# Patient Record
Sex: FEMALE | Race: WHITE | NOT HISPANIC OR LATINO | Employment: OTHER | URBAN - METROPOLITAN AREA
[De-identification: names, ages, dates, MRNs, and addresses within clinical notes are randomized per-mention and may not be internally consistent; named-entity substitution may affect disease eponyms.]

---

## 2020-01-21 ENCOUNTER — APPOINTMENT (OUTPATIENT)
Dept: LAB | Facility: HOSPITAL | Age: 45
End: 2020-01-21

## 2020-01-21 ENCOUNTER — PREP FOR SURGERY (OUTPATIENT)
Dept: OTHER | Facility: HOSPITAL | Age: 45
End: 2020-01-21

## 2020-01-21 ENCOUNTER — OFFICE VISIT (OUTPATIENT)
Dept: GASTROENTEROLOGY | Facility: CLINIC | Age: 45
End: 2020-01-21

## 2020-01-21 VITALS
SYSTOLIC BLOOD PRESSURE: 130 MMHG | WEIGHT: 265 LBS | HEIGHT: 67 IN | TEMPERATURE: 97.6 F | OXYGEN SATURATION: 98 % | HEART RATE: 75 BPM | DIASTOLIC BLOOD PRESSURE: 90 MMHG | BODY MASS INDEX: 41.59 KG/M2

## 2020-01-21 DIAGNOSIS — R10.12 LEFT UPPER QUADRANT PAIN: ICD-10-CM

## 2020-01-21 DIAGNOSIS — K21.9 GASTROESOPHAGEAL REFLUX DISEASE, ESOPHAGITIS PRESENCE NOT SPECIFIED: Primary | ICD-10-CM

## 2020-01-21 DIAGNOSIS — K58.9 IRRITABLE BOWEL SYNDROME, UNSPECIFIED TYPE: ICD-10-CM

## 2020-01-21 DIAGNOSIS — R11.0 NAUSEA: ICD-10-CM

## 2020-01-21 PROBLEM — G89.29 CHRONIC LOW BACK PAIN: Status: ACTIVE | Noted: 2018-08-08

## 2020-01-21 PROBLEM — M54.50 CHRONIC LOW BACK PAIN: Status: ACTIVE | Noted: 2018-08-08

## 2020-01-21 LAB
ALBUMIN SERPL-MCNC: 4.6 G/DL (ref 3.5–5.2)
ALBUMIN/GLOB SERPL: 1.3 G/DL
ALP SERPL-CCNC: 62 U/L (ref 39–117)
ALT SERPL W P-5'-P-CCNC: 50 U/L (ref 1–33)
AMYLASE SERPL-CCNC: 33 U/L (ref 28–100)
ANION GAP SERPL CALCULATED.3IONS-SCNC: 13.6 MMOL/L (ref 5–15)
AST SERPL-CCNC: 68 U/L (ref 1–32)
BASOPHILS # BLD AUTO: 0.05 10*3/MM3 (ref 0–0.2)
BASOPHILS NFR BLD AUTO: 0.8 % (ref 0–1.5)
BILIRUB SERPL-MCNC: 0.3 MG/DL (ref 0.1–1.2)
BUN BLD-MCNC: 17 MG/DL (ref 6–20)
BUN/CREAT SERPL: 15.9 (ref 7–25)
CALCIUM SPEC-SCNC: 9.2 MG/DL (ref 8.6–10.5)
CHLORIDE SERPL-SCNC: 98 MMOL/L (ref 98–107)
CO2 SERPL-SCNC: 28.4 MMOL/L (ref 22–29)
CREAT BLD-MCNC: 1.07 MG/DL (ref 0.57–1)
DEPRECATED RDW RBC AUTO: 51 FL (ref 37–54)
EOSINOPHIL # BLD AUTO: 0.32 10*3/MM3 (ref 0–0.4)
EOSINOPHIL NFR BLD AUTO: 4.9 % (ref 0.3–6.2)
ERYTHROCYTE [DISTWIDTH] IN BLOOD BY AUTOMATED COUNT: 14.8 % (ref 12.3–15.4)
GFR SERPL CREATININE-BSD FRML MDRD: 56 ML/MIN/1.73
GLOBULIN UR ELPH-MCNC: 3.5 GM/DL
GLUCOSE BLD-MCNC: 125 MG/DL (ref 65–99)
HCT VFR BLD AUTO: 39 % (ref 34–46.6)
HGB BLD-MCNC: 12.5 G/DL (ref 12–15.9)
IMM GRANULOCYTES # BLD AUTO: 0.11 10*3/MM3 (ref 0–0.05)
IMM GRANULOCYTES NFR BLD AUTO: 1.7 % (ref 0–0.5)
LIPASE SERPL-CCNC: 35 U/L (ref 13–60)
LYMPHOCYTES # BLD AUTO: 1.69 10*3/MM3 (ref 0.7–3.1)
LYMPHOCYTES NFR BLD AUTO: 25.7 % (ref 19.6–45.3)
MCH RBC QN AUTO: 30 PG (ref 26.6–33)
MCHC RBC AUTO-ENTMCNC: 32.1 G/DL (ref 31.5–35.7)
MCV RBC AUTO: 93.5 FL (ref 79–97)
MONOCYTES # BLD AUTO: 0.45 10*3/MM3 (ref 0.1–0.9)
MONOCYTES NFR BLD AUTO: 6.8 % (ref 5–12)
NEUTROPHILS # BLD AUTO: 3.96 10*3/MM3 (ref 1.7–7)
NEUTROPHILS NFR BLD AUTO: 60.1 % (ref 42.7–76)
NRBC BLD AUTO-RTO: 0 /100 WBC (ref 0–0.2)
PLATELET # BLD AUTO: 281 10*3/MM3 (ref 140–450)
PMV BLD AUTO: 10.4 FL (ref 6–12)
POTASSIUM BLD-SCNC: 4 MMOL/L (ref 3.5–5.2)
PROT SERPL-MCNC: 8.1 G/DL (ref 6–8.5)
RBC # BLD AUTO: 4.17 10*6/MM3 (ref 3.77–5.28)
SODIUM BLD-SCNC: 140 MMOL/L (ref 136–145)
WBC NRBC COR # BLD: 6.58 10*3/MM3 (ref 3.4–10.8)

## 2020-01-21 PROCEDURE — 82150 ASSAY OF AMYLASE: CPT | Performed by: NURSE PRACTITIONER

## 2020-01-21 PROCEDURE — 99214 OFFICE O/P EST MOD 30 MIN: CPT | Performed by: NURSE PRACTITIONER

## 2020-01-21 PROCEDURE — 36415 COLL VENOUS BLD VENIPUNCTURE: CPT | Performed by: NURSE PRACTITIONER

## 2020-01-21 PROCEDURE — 85025 COMPLETE CBC W/AUTO DIFF WBC: CPT | Performed by: NURSE PRACTITIONER

## 2020-01-21 PROCEDURE — 80053 COMPREHEN METABOLIC PANEL: CPT | Performed by: NURSE PRACTITIONER

## 2020-01-21 PROCEDURE — 83690 ASSAY OF LIPASE: CPT | Performed by: NURSE PRACTITIONER

## 2020-01-21 RX ORDER — PROMETHAZINE HYDROCHLORIDE 25 MG/1
25 TABLET ORAL EVERY 6 HOURS PRN
COMMUNITY
Start: 2019-11-21

## 2020-01-21 RX ORDER — SUCRALFATE 1 G/1
TABLET ORAL
COMMUNITY
End: 2020-01-27 | Stop reason: SDUPTHER

## 2020-01-21 RX ORDER — IPRATROPIUM/ALBUTEROL SULFATE 20-100 MCG
MIST INHALER (GRAM) INHALATION
COMMUNITY
Start: 2019-11-18

## 2020-01-21 RX ORDER — LOSARTAN POTASSIUM AND HYDROCHLOROTHIAZIDE 12.5; 5 MG/1; MG/1
TABLET ORAL DAILY
COMMUNITY
Start: 2019-11-27 | End: 2021-03-12

## 2020-01-21 RX ORDER — BUDESONIDE AND FORMOTEROL FUMARATE DIHYDRATE 160; 4.5 UG/1; UG/1
2 AEROSOL RESPIRATORY (INHALATION)
COMMUNITY

## 2020-01-21 RX ORDER — GABAPENTIN 800 MG/1
800 TABLET ORAL 2 TIMES DAILY
COMMUNITY

## 2020-01-21 RX ORDER — DICYCLOMINE HCL 20 MG
TABLET ORAL
COMMUNITY
Start: 2019-12-16 | End: 2020-01-21 | Stop reason: SDUPTHER

## 2020-01-21 RX ORDER — LEVOTHYROXINE SODIUM 0.2 MG/1
200 TABLET ORAL DAILY
COMMUNITY
Start: 2019-11-13

## 2020-01-21 RX ORDER — DICYCLOMINE HCL 20 MG
40 TABLET ORAL 3 TIMES DAILY PRN
Qty: 90 TABLET | Refills: 2 | Status: SHIPPED | OUTPATIENT
Start: 2020-01-21 | End: 2021-03-12

## 2020-01-21 RX ORDER — RANITIDINE 150 MG/1
TABLET ORAL
COMMUNITY
Start: 2019-12-15 | End: 2020-01-27 | Stop reason: SDUPTHER

## 2020-01-21 RX ORDER — ESOMEPRAZOLE MAGNESIUM 40 MG/1
CAPSULE, DELAYED RELEASE ORAL
COMMUNITY
Start: 2018-06-11 | End: 2020-03-02 | Stop reason: SDUPTHER

## 2020-01-21 RX ORDER — AMLODIPINE BESYLATE 10 MG/1
10 TABLET ORAL DAILY
COMMUNITY
End: 2022-10-10

## 2020-01-21 RX ORDER — HYDROCODONE BITARTRATE AND ACETAMINOPHEN 7.5; 325 MG/1; MG/1
TABLET ORAL EVERY 4 HOURS PRN
COMMUNITY

## 2020-01-21 RX ORDER — ALBUTEROL SULFATE 90 UG/1
AEROSOL, METERED RESPIRATORY (INHALATION)
Status: ON HOLD | COMMUNITY
Start: 2019-11-11 | End: 2022-03-23

## 2020-01-21 RX ORDER — DULOXETIN HYDROCHLORIDE 30 MG/1
CAPSULE, DELAYED RELEASE ORAL
COMMUNITY
Start: 2019-12-18 | End: 2022-01-06

## 2020-01-21 RX ORDER — TIZANIDINE 4 MG/1
4 TABLET ORAL EVERY 8 HOURS PRN
COMMUNITY
Start: 2018-01-20

## 2020-01-21 NOTE — PATIENT INSTRUCTIONS
Schedule EGD for further evaluation of symptoms.    Check lab work today.    For GERD, continue Nexium and randitine as prescribed.    For abdominal pain, may continue to use dicyclomine as needed.  Refill provided.    Follow-up after testing complete.  Call for any new or worsening symptoms.

## 2020-01-21 NOTE — PROGRESS NOTES
Chief Complaint   Patient presents with   • Abdominal Pain   • Abdominal Cramping   • Nausea       HPI  Patient is a 44-year-old female who presents today for follow-up.  She has a history of gastritis, diverticulitis status post resection, and has had prior episodes of pancreatitis for which she underwent ERCP x3.    Patient presents today with concerns about abdominal pain.  Pain is present to the left upper quadrant and radiates across the upper abdomen.  It has been occurring intermittently for 6 months.  It is described at times as a ripping sensation at other times as a burning sensation.  It is worse if she eats or drinks.  She also at times experience as a spasming to her right upper quadrant.  She has had nausea associated with this but denies any vomiting.    She has been taking Nexium and ranitidine.  She is having persistent symptoms despite use of these medications.  She saw her ENT recently for hoarseness who felt that this was related to reflux.    She also has a history of irritable bowel syndrome.  She has been taking dicyclomine for this.  She has been taking 2 of the 20 mg tablets which has been helping her symptoms.  With use of this medication she reports regular bowel movements.  Denies any blood in the stool or dark stool.    Review of Systems   Constitutional: Negative for appetite change, chills, diaphoresis, fatigue, fever and unexpected weight change.   HENT: Positive for voice change. Negative for dental problem, ear pain, mouth sores, rhinorrhea, sore throat and trouble swallowing.    Eyes: Negative for pain, redness and visual disturbance.   Respiratory: Positive for wheezing. Negative for cough, chest tightness and shortness of breath.    Cardiovascular: Negative for chest pain, palpitations and leg swelling.   Gastrointestinal: Positive for abdominal pain and nausea. Negative for abdominal distention, blood in stool, constipation, diarrhea and vomiting.   Endocrine: Negative for cold  intolerance, heat intolerance, polydipsia, polyphagia and polyuria.   Genitourinary: Positive for frequency. Negative for dysuria and urgency.   Musculoskeletal: Positive for arthralgias, back pain and myalgias. Negative for joint swelling and neck pain.   Skin: Negative for rash.   Allergic/Immunologic: Negative for environmental allergies, food allergies and immunocompromised state.   Neurological: Negative for dizziness, tremors, seizures, weakness, numbness and headaches.   Hematological: Negative for adenopathy. Does not bruise/bleed easily.   Psychiatric/Behavioral: Positive for dysphoric mood and sleep disturbance. Negative for suicidal ideas. The patient is not nervous/anxious.         Problem List:    Patient Active Problem List   Diagnosis   • Anxiety   • Chronic low back pain   • COPD (chronic obstructive pulmonary disease) (CMS/[facility])   • Fibromyalgia   • Gastroesophageal reflux disease   • HTN (hypertension)   • Thyroid disease   • Irritable bowel syndrome   • Nausea   • Left upper quadrant pain       Medical History:    Past Medical History:   Diagnosis Date   • Asthma    • COPD (chronic obstructive pulmonary disease) (CMS/[facility])    • Diverticulitis of colon    • Fibromyalgia    • Gastritis    • Irritable bowel syndrome         Social History:    Social History     Socioeconomic History   • Marital status: Legally      Spouse name: Not on file   • Number of children: Not on file   • Years of education: Not on file   • Highest education level: Not on file   Tobacco Use   • Smoking status: Former Smoker   • Smokeless tobacco: Never Used   Substance and Sexual Activity   • Alcohol use: Never     Frequency: Never   • Drug use: Never       Family History:   Family History   Problem Relation Age of Onset   • Heart attack Mother    • Diverticulosis Mother    • Colon cancer Father    • Heart attack Father        Surgical History:   Past Surgical History:   Procedure Laterality Date   • ABDOMINAL SURGERY      • AXILLARY ABCESS IRRIGATION AND DEBRIDEMENT     • CHOLECYSTECTOMY     • COLON RESECTION     • COLONOSCOPY     • HYSTERECTOMY     • SINUS SURGERY      3   • THYROIDECTOMY     • TONSILLECTOMY           Current Outpatient Medications:   •  amLODIPine (NORVASC) 10 MG tablet, amlodipine 10 mg tablet, Disp: , Rfl:   •  budesonide-formoterol (SYMBICORT) 160-4.5 MCG/ACT inhaler, Inhale 2 puffs., Disp: , Rfl:   •  COMBIVENT RESPIMAT  MCG/ACT inhaler, INL 1 PUFF QID, Disp: , Rfl:   •  dicyclomine (BENTYL) 20 MG tablet, Take 2 tablets by mouth 3 (Three) Times a Day As Needed (abdominal pain, diarrhea)., Disp: 90 tablet, Rfl: 2  •  DULoxetine (CYMBALTA) 30 MG capsule, TK 1 C PO QD, Disp: , Rfl:   •  esomeprazole (nexIUM) 40 MG capsule, esomeprazole magnesium 40 mg capsule,delayed release, Disp: , Rfl:   •  gabapentin (NEURONTIN) 800 MG tablet, gabapentin 800 mg tablet, Disp: , Rfl:   •  HYDROcodone-acetaminophen (NORCO) 7.5-325 MG per tablet, Take  by mouth., Disp: , Rfl:   •  levothyroxine (SYNTHROID, LEVOTHROID) 200 MCG tablet, Take  by mouth Daily., Disp: , Rfl:   •  losartan-hydrochlorothiazide (HYZAAR) 50-12.5 MG per tablet, Take  by mouth Daily., Disp: , Rfl:   •  promethazine (PHENERGAN) 25 MG tablet, TK 1 T PO Q 4 H PRN NV, Disp: , Rfl:   •  raNITIdine (ZANTAC) 150 MG tablet, TK 1 T PO AT BEDTIME, Disp: , Rfl:   •  sodium chloride 0.9 % nebulizer solution 0.88 mL with albuterol (5 MG/ML) 0.5% nebulizer solution 0.6 mg, , Disp: , Rfl:   •  sucralfate (CARAFATE) 1 g tablet, sucralfate 1 gram tablet, Disp: , Rfl:   •  tiZANidine (ZANAFLEX) 4 MG tablet, Every 8 (Eight) Hours., Disp: , Rfl:   •  VENTOLIN  (90 Base) MCG/ACT inhaler, INHALE 2 PUFFS TID AS NEEDED, Disp: , Rfl:     Dilaudid  [hydromorphone hcl]; Statins; Ondansetron hcl; Adhesive tape; Butorphanol; and Ondansetron    Vitals:    01/21/20 0827   BP: 130/90   Pulse: 75   Temp: 97.6 °F (36.4 °C)   SpO2: 98%       Physical Exam   Constitutional: She  is oriented to person, place, and time. She appears well-developed and well-nourished. No distress.   HENT:   Head: Normocephalic and atraumatic.   Eyes: No scleral icterus.   Cardiovascular: Normal rate and regular rhythm.   Pulmonary/Chest: Effort normal and breath sounds normal. No respiratory distress.   Abdominal: Soft. Normal appearance and bowel sounds are normal. There is tenderness in the epigastric area.   Neurological: She is alert and oriented to person, place, and time.   Skin: Skin is warm and dry.   Psychiatric: She has a normal mood and affect. Thought content normal.   Vitals reviewed.      Assessment/ Plan  Diagnoses and all orders for this visit:    Gastroesophageal reflux disease, esophagitis presence not specified  -     CBC & Differential    Left upper quadrant pain  -     CBC & Differential  -     Comprehensive Metabolic Panel  -     Amylase  -     Lipase    Nausea    Irritable bowel syndrome, unspecified type    Other orders  -     sodium chloride 0.9 % nebulizer solution 0.88 mL with albuterol (5 MG/ML) 0.5% nebulizer solution 0.6 mg  -     esomeprazole (nexIUM) 40 MG capsule; esomeprazole magnesium 40 mg capsule,delayed release  -     HYDROcodone-acetaminophen (NORCO) 7.5-325 MG per tablet; Take  by mouth.  -     VENTOLIN  (90 Base) MCG/ACT inhaler; INHALE 2 PUFFS TID AS NEEDED  -     amLODIPine (NORVASC) 10 MG tablet; amlodipine 10 mg tablet  -     budesonide-formoterol (SYMBICORT) 160-4.5 MCG/ACT inhaler; Inhale 2 puffs.  -     Discontinue: dicyclomine (BENTYL) 20 MG tablet; TK 1 T PO TID  -     DULoxetine (CYMBALTA) 30 MG capsule; TK 1 C PO QD  -     gabapentin (NEURONTIN) 800 MG tablet; gabapentin 800 mg tablet  -     COMBIVENT RESPIMAT  MCG/ACT inhaler; INL 1 PUFF QID  -     levothyroxine (SYNTHROID, LEVOTHROID) 200 MCG tablet; Take  by mouth Daily.  -     losartan-hydrochlorothiazide (HYZAAR) 50-12.5 MG per tablet; Take  by mouth Daily.  -     promethazine (PHENERGAN) 25  MG tablet; TK 1 T PO Q 4 H PRN NV  -     raNITIdine (ZANTAC) 150 MG tablet; TK 1 T PO AT BEDTIME  -     sucralfate (CARAFATE) 1 g tablet; sucralfate 1 gram tablet  -     tiZANidine (ZANAFLEX) 4 MG tablet; Every 8 (Eight) Hours.  -     dicyclomine (BENTYL) 20 MG tablet; Take 2 tablets by mouth 3 (Three) Times a Day As Needed (abdominal pain, diarrhea).    Schedule EGD for further evaluation of symptoms.    Check lab work today.    Obtain prior records for review.    For GERD, continue Nexium and randitine as prescribed.    For abdominal pain, may continue to use dicyclomine as needed.  Refill provided.    Follow-up after testing complete.  Call for any new or worsening symptoms.    Return for Review results after testing complete.      Discussion:  We will schedule updated EGD for further evaluation of symptoms, assess for any evidence of persistent gastritis or peptic ulcer disease.  Will check lab work today to evaluate liver and pancreas.  If labs are abnormal, may need to consider updated imaging.

## 2020-01-24 RX ORDER — RANITIDINE 150 MG/1
TABLET ORAL
Qty: 30 TABLET | OUTPATIENT
Start: 2020-01-24

## 2020-01-24 NOTE — TELEPHONE ENCOUNTER
Denied refill of Raniditine due to recall.    Ok to change this prescription to famotidine 20mg daily at bedtime for GERD

## 2020-01-27 RX ORDER — RANITIDINE 150 MG/1
150 TABLET ORAL NIGHTLY
Qty: 30 TABLET | Refills: 2 | Status: SHIPPED | OUTPATIENT
Start: 2020-01-27 | End: 2021-03-12

## 2020-01-27 RX ORDER — SUCRALFATE 1 G/1
TABLET ORAL
Qty: 90 TABLET | Refills: 1 | Status: SHIPPED | OUTPATIENT
Start: 2020-01-27 | End: 2020-04-01 | Stop reason: SDUPTHER

## 2020-01-30 ENCOUNTER — OUTSIDE FACILITY SERVICE (OUTPATIENT)
Dept: GASTROENTEROLOGY | Facility: CLINIC | Age: 45
End: 2020-01-30

## 2020-01-30 ENCOUNTER — LAB REQUISITION (OUTPATIENT)
Dept: LAB | Facility: HOSPITAL | Age: 45
End: 2020-01-30

## 2020-01-30 DIAGNOSIS — Z00.00 ENCOUNTER FOR GENERAL ADULT MEDICAL EXAMINATION WITHOUT ABNORMAL FINDINGS: ICD-10-CM

## 2020-01-30 PROCEDURE — 88305 TISSUE EXAM BY PATHOLOGIST: CPT | Performed by: INTERNAL MEDICINE

## 2020-01-30 PROCEDURE — 43239 EGD BIOPSY SINGLE/MULTIPLE: CPT | Performed by: INTERNAL MEDICINE

## 2020-01-31 LAB
CYTO UR: NORMAL
LAB AP CASE REPORT: NORMAL
LAB AP CLINICAL INFORMATION: NORMAL
PATH REPORT.FINAL DX SPEC: NORMAL
PATH REPORT.GROSS SPEC: NORMAL

## 2020-02-12 ENCOUNTER — OFFICE VISIT (OUTPATIENT)
Dept: GASTROENTEROLOGY | Facility: CLINIC | Age: 45
End: 2020-02-12

## 2020-02-12 VITALS
TEMPERATURE: 99 F | HEART RATE: 78 BPM | BODY MASS INDEX: 44.57 KG/M2 | DIASTOLIC BLOOD PRESSURE: 84 MMHG | SYSTOLIC BLOOD PRESSURE: 130 MMHG | HEIGHT: 67 IN | WEIGHT: 284 LBS | OXYGEN SATURATION: 99 %

## 2020-02-12 DIAGNOSIS — K31.84 GASTROPARESIS: Primary | ICD-10-CM

## 2020-02-12 DIAGNOSIS — K76.0 FATTY LIVER: ICD-10-CM

## 2020-02-12 PROCEDURE — 99214 OFFICE O/P EST MOD 30 MIN: CPT | Performed by: NURSE PRACTITIONER

## 2020-02-12 RX ORDER — ERYTHROMYCIN 250 MG/1
250 TABLET, COATED ORAL
Qty: 90 TABLET | Refills: 1 | Status: SHIPPED | OUTPATIENT
Start: 2020-02-12 | End: 2021-03-12

## 2020-02-12 NOTE — PROGRESS NOTES
Chief Complaint   Patient presents with   • Abdominal Pain     RUQ at night   • Irritable Bowel Syndrome       HPI  Patient is a 44-year-old female who presents today for follow-up.  She has a history of gastritis, diverticulitis status post resection, IBS, and pancreatitis for which she has undergone ERCP.    She was last seen in the office January 2020 for abdominal pain, GERD, IBS.  Lab work was obtained was normal amylase and lipase.  LFTs were mildly elevated with ALT 50 and AST 68, which appears stable. EGD was performed which showed a large amount of food in the gastric body, otherwise normal. Duodenal biopsies were unremarkable, gastric biopsies were negative for H. Pylori.    She had an MRCP in December 2018 that showed enlarged liver secondary to severe fatty infiltration, incidental 1.8 cm hepatic angioma.  There was an unremarkable appearance of the biliary tree postcholecystectomy.    Gastric emptying study from 2014 showed borderline  low gastric emptying for the first 2 hours, 4-hour gastric emptying time was normal.    Patient presents today for follow-up.  She reports she has had continued symptoms since her last office visit.    She reports intermittent abdominal pain.  Pain has more recently been present to the right upper quadrant and is described as a dull ache.  She continues to report nausea and reflux despite use of PPI.  She has been experiencing abdominal bloating and early satiety.    She reports over the last year she has had close to a 100 pound weight gain.    Patient reports she recently had consultation with Dr. Pham for Lap band.  She is meeting with a nutritionist tomorrow to start a weight loss program.    Review of Systems   Constitutional: Negative for appetite change, chills, diaphoresis, fatigue, fever and unexpected weight change.   HENT: Negative for dental problem, mouth sores, rhinorrhea, sore throat and voice change.    Eyes: Negative for pain, redness and visual  disturbance.   Respiratory: Negative for cough, chest tightness and wheezing.    Cardiovascular: Negative for chest pain, palpitations and leg swelling.   Endocrine: Negative for cold intolerance, heat intolerance, polydipsia, polyphagia and polyuria.   Genitourinary: Negative for dysuria, frequency, hematuria and urgency.   Musculoskeletal: Positive for arthralgias, back pain and myalgias. Negative for joint swelling and neck pain.   Skin: Negative for rash.   Allergic/Immunologic: Negative for environmental allergies, food allergies and immunocompromised state.   Neurological: Negative for dizziness, seizures, weakness, numbness and headaches.   Hematological: Does not bruise/bleed easily.   Psychiatric/Behavioral: Positive for sleep disturbance. The patient is not nervous/anxious.         Problem List:    Patient Active Problem List   Diagnosis   • Anxiety   • Chronic low back pain   • COPD (chronic obstructive pulmonary disease) (CMS/[facility])   • Fibromyalgia   • Gastroesophageal reflux disease   • HTN (hypertension)   • Thyroid disease   • Irritable bowel syndrome   • Nausea   • Left upper quadrant pain       Medical History:    Past Medical History:   Diagnosis Date   • Asthma    • COPD (chronic obstructive pulmonary disease) (CMS/[facility])    • Diverticulitis of colon    • Fibromyalgia    • Gastritis    • Irritable bowel syndrome         Social History:    Social History     Socioeconomic History   • Marital status: Legally      Spouse name: Not on file   • Number of children: Not on file   • Years of education: Not on file   • Highest education level: Not on file   Tobacco Use   • Smoking status: Former Smoker   • Smokeless tobacco: Never Used   Substance and Sexual Activity   • Alcohol use: Never     Frequency: Never   • Drug use: Never       Family History:   Family History   Problem Relation Age of Onset   • Heart attack Mother    • Diverticulosis Mother    • Colon cancer Father    • Heart attack Father         Surgical History:   Past Surgical History:   Procedure Laterality Date   • ABDOMINAL SURGERY     • AXILLARY ABCESS IRRIGATION AND DEBRIDEMENT     • CHOLECYSTECTOMY     • COLON RESECTION     • COLONOSCOPY  2017   • HYSTERECTOMY     • SINUS SURGERY      3   • THYROIDECTOMY     • TONSILLECTOMY     • UPPER GASTROINTESTINAL ENDOSCOPY  01/2020         Current Outpatient Medications:   •  amLODIPine (NORVASC) 10 MG tablet, amlodipine 10 mg tablet, Disp: , Rfl:   •  budesonide-formoterol (SYMBICORT) 160-4.5 MCG/ACT inhaler, Inhale 2 puffs., Disp: , Rfl:   •  COMBIVENT RESPIMAT  MCG/ACT inhaler, INL 1 PUFF QID, Disp: , Rfl:   •  dicyclomine (BENTYL) 20 MG tablet, Take 2 tablets by mouth 3 (Three) Times a Day As Needed (abdominal pain, diarrhea)., Disp: 90 tablet, Rfl: 2  •  DULoxetine (CYMBALTA) 30 MG capsule, TK 1 C PO QD, Disp: , Rfl:   •  erythromycin base (E-MYCIN) 250 MG tablet, Take 1 tablet by mouth 3 (Three) Times a Day Before Meals., Disp: 90 tablet, Rfl: 1  •  esomeprazole (nexIUM) 40 MG capsule, esomeprazole magnesium 40 mg capsule,delayed release, Disp: , Rfl:   •  gabapentin (NEURONTIN) 800 MG tablet, gabapentin 800 mg tablet, Disp: , Rfl:   •  HYDROcodone-acetaminophen (NORCO) 7.5-325 MG per tablet, Take  by mouth., Disp: , Rfl:   •  levothyroxine (SYNTHROID, LEVOTHROID) 200 MCG tablet, Take  by mouth Daily., Disp: , Rfl:   •  losartan-hydrochlorothiazide (HYZAAR) 50-12.5 MG per tablet, Take  by mouth Daily., Disp: , Rfl:   •  promethazine (PHENERGAN) 25 MG tablet, TK 1 T PO Q 4 H PRN NV, Disp: , Rfl:   •  raNITIdine (ZANTAC) 150 MG tablet, Take 1 tablet by mouth Every Night., Disp: 30 tablet, Rfl: 2  •  sodium chloride 0.9 % nebulizer solution 0.88 mL with albuterol (5 MG/ML) 0.5% nebulizer solution 0.6 mg, , Disp: , Rfl:   •  sucralfate (CARAFATE) 1 g tablet, Dissolve 1 tablet in 1 tablespoon of water and drink three times daily, Disp: 90 tablet, Rfl: 1  •  tiZANidine (ZANAFLEX) 4 MG tablet, Every  8 (Eight) Hours., Disp: , Rfl:   •  VENTOLIN  (90 Base) MCG/ACT inhaler, INHALE 2 PUFFS TID AS NEEDED, Disp: , Rfl:     Allergies:  Dilaudid  [hydromorphone hcl]; Statins; Ondansetron hcl; Adhesive tape; Butorphanol; and Ondansetron    Vitals:    02/12/20 0836   BP: 130/84   Pulse: 78   Temp: 99 °F (37.2 °C)   SpO2: 99%       Physical Exam   Constitutional: She is oriented to person, place, and time. She appears well-developed and well-nourished. No distress.   HENT:   Head: Normocephalic and atraumatic.   Eyes: No scleral icterus.   Cardiovascular: Normal rate and regular rhythm.   Pulmonary/Chest: Effort normal and breath sounds normal. No respiratory distress.   Abdominal: Soft. Bowel sounds are normal. She exhibits no distension. There is no tenderness.   Neurological: She is alert and oriented to person, place, and time.   Skin: Skin is warm and dry.   Psychiatric: She has a normal mood and affect. Thought content normal.   Vitals reviewed.      Assessment/ Plan  Diagnoses and all orders for this visit:    Gastroparesis    Fatty liver    Other orders  -     erythromycin base (E-MYCIN) 250 MG tablet; Take 1 tablet by mouth 3 (Three) Times a Day Before Meals.    For gastroparesis, begin trial of erythromycin as prescribed.  Follow gastroparesis diet.    For fatty liver, recommend following a low-fat and low sugar diet.  Weight loss is recommended as well as regular exercise.  Recommend management of high cholesterol and blood glucose with primary care provider if indicated. Alcohol avoidance is recommended.    Return in about 3 months (around 5/12/2020).      Discussion:  Discussed with patient today suspect gastroparesis is etiology of upper GI symptoms.  Dietary and medication management of this condition were reviewed today.  Discussed that full right upper quadrant pain may be secondary to fatty liver and stretch of liver capsule.    EGD and lab work findings reviewed at today's office visit.   Discussed diagnosis of gastroparesis and treatment options.  Also discussed fatty liver and low-fat/low sugar diet and weight loss were recommended.    If erythromycin does not lead to any improvement or patient develops tachyphylaxis, to consider trial of domperidone for gastroparesis.  Risk of cardiac arrhythmias and need for EKG monitoring associated with domperidone were reviewed at today's office visit.

## 2020-02-12 NOTE — PATIENT INSTRUCTIONS
For gastroparesis, recommend eating smaller more frequent meals as opposed to large meals.  Recommend following a low-fat and low fiber diet, which is easier to digest.    For gastroparesis, begin trial of erythromycin as prescribed.  Prescription sent to pharmacy.    For fatty liver, recommend following a low-fat and low sugar diet.  Weight loss is recommended as well as regular exercise.  Recommend management of high cholesterol and blood glucose with primary care provider if indicated. Alcohol avoidance is recommended.      Gastroparesis    Gastroparesis is a condition in which food takes longer than normal to empty from the stomach. The condition is usually long-lasting (chronic). It may also be called delayed gastric emptying.  There is no cure, but there are treatments and things that you can do at home to help relieve symptoms. Treating the underlying condition that causes gastroparesis can also help relieve symptoms.  What are the causes?  In many cases, the cause of this condition is not known. Possible causes include:  · A hormone (endocrine) disorder, such as hypothyroidism or diabetes.  · A nervous system disease, such as Parkinson's disease or multiple sclerosis.  · Cancer, infection, or surgery that affects the stomach or vagus nerve. The vagus nerve runs from your chest, through your neck, to the lower part of your brain.  · A connective tissue disorder, such as scleroderma.  · Certain medicines.  What increases the risk?  You are more likely to develop this condition if you:  · Have certain disorders or diseases, including:  ? An endocrine disorder.  ? An eating disorder.  ? Amyloidosis.  ? Scleroderma.  ? Parkinson's disease.  ? Multiple sclerosis.  ? Cancer or infection of the stomach or the vagus nerve.  · Have had surgery on the stomach or vagus nerve.  · Take certain medicines.  · Are female.  What are the signs or symptoms?  Symptoms of this condition include:  · Feeling full after eating very  little.  · Nausea.  · Vomiting.  · Heartburn.  · Abdominal bloating.  · Inconsistent blood sugar (glucose) levels on blood tests.  · Lack of appetite.  · Weight loss.  · Acid from the stomach coming up into the esophagus (gastroesophageal reflux).  · Sudden tightening (spasm) of the stomach, which can be painful.  Symptoms may come and go. Some people may not notice any symptoms.  How is this diagnosed?  This condition is diagnosed with tests, such as:  · Tests that check how long it takes food to move through the stomach and intestines. These tests include:  ? Upper gastrointestinal (GI) series. For this test, you drink a liquid that shows up well on X-rays, and then X-rays will be taken of your intestines.  ? Gastric emptying scintigraphy. For this test, you eat food that contains a small amount of radioactive material, and then scans are taken.  ? Wireless capsule GI monitoring system. For this test, you swallow a pill (capsule) that records information about how foods and fluid move through your stomach.  · Gastric manometry. For this test, a tube is passed down your throat and into your stomach to measure electrical and muscular activity.  · Endoscopy. For this test, a long, thin tube is passed down your throat and into your stomach to check for problems in your stomach lining.  · Ultrasound. This test uses sound waves to create images of inside the body. This can help rule out gallbladder disease or pancreatitis as a cause of your symptoms.  How is this treated?  There is no cure for gastroparesis. Treatment may include:  · Treating the underlying cause.  · Managing your symptoms by making changes to your diet and exercise habits.  · Taking medicines to control nausea and vomiting and to stimulate stomach muscles.  · Getting food through a feeding tube in the hospital. This may be done in severe cases.  · Having surgery to insert a device into your body that helps improve stomach emptying and control nausea  and vomiting (gastric neurostimulator).  Follow these instructions at home:  · Take over-the-counter and prescription medicines only as told by your health care provider.  · Follow instructions from your health care provider about eating or drinking restrictions. Your health care provider may recommend that you:  ? Eat smaller meals more often.  ? Eat low-fat foods.  ? Eat low-fiber forms of high-fiber foods. For example, eat cooked vegetables instead of raw vegetables.  ? Have only liquid foods instead of solid foods. Liquid foods are easier to digest.  · Drink enough fluid to keep your urine pale yellow.  · Exercise as often as told by your health care provider.  · Keep all follow-up visits as told by your health care provider. This is important.  Contact a health care provider if you:  · Notice that your symptoms do not improve with treatment.  · Have new symptoms.  Get help right away if you:  · Have severe abdominal pain that does not improve with treatment.  · Have nausea that is severe or does not go away.  · Cannot drink fluids without vomiting.  Summary  · Gastroparesis is a chronic condition in which food takes longer than normal to empty from the stomach.  · Symptoms include nausea, vomiting, heartburn, abdominal bloating, and loss of appetite.  · Eating smaller portions, and low-fat, low-fiber foods may help you manage your symptoms.  · Get help right away if you have severe abdominal pain.  This information is not intended to replace advice given to you by your health care provider. Make sure you discuss any questions you have with your health care provider.  Document Released: 12/18/2006 Document Revised: 10/23/2018 Document Reviewed: 10/23/2018  [employer] © 2019 [employer]

## 2020-03-02 RX ORDER — ESOMEPRAZOLE MAGNESIUM 40 MG/1
40 CAPSULE, DELAYED RELEASE ORAL DAILY
Qty: 30 CAPSULE | Refills: 5 | Status: SHIPPED | OUTPATIENT
Start: 2020-03-02 | End: 2020-08-25

## 2020-04-01 DIAGNOSIS — K21.9 GASTROESOPHAGEAL REFLUX DISEASE, ESOPHAGITIS PRESENCE NOT SPECIFIED: Primary | ICD-10-CM

## 2020-04-01 RX ORDER — SUCRALFATE 1 G/1
TABLET ORAL
Qty: 90 TABLET | Refills: 1 | Status: SHIPPED | OUTPATIENT
Start: 2020-04-01 | End: 2021-03-12

## 2020-08-25 RX ORDER — ESOMEPRAZOLE MAGNESIUM 40 MG/1
40 CAPSULE, DELAYED RELEASE ORAL DAILY
Qty: 30 CAPSULE | Refills: 5 | Status: SHIPPED | OUTPATIENT
Start: 2020-08-25

## 2021-02-01 NOTE — PROGRESS NOTES
"Chief Complaint   Patient presents with   • Abdominal Pain   Abdominal pain          History of Present Illness  45-year old female presents today for for follow up. She has a history of gastroparesis. She feels food is staying in her stomach. She has a history of COVID requiring a 62-day hospital stay on the vent and tracheostomy. She remains on O2. She had DVTs post COVID and takes Eliquis.      She reports abdominal pain in her RUQ that she describes as throbbing. She does not relate this to her BMs. This feels a little stronger than her IBS discomfort. She has a history of cholecystectomy. She takes     She has a history of fatty liver. Transaminases are slightly elevated based upon lab work January 2021. She reports weight gain.    She has a history of GERD and takes esomeprazole 40 mg daily and sucralfate.              Result Review :      ENDOSCOPY - SCAN -  EGD 01/30/2020 (01/30/2020)  COLONOSCOPY - SCAN - [facility] (06/13/2017)  MICROFILM RECORDS - SCAN - GASTRO GES [facility] (03/13/2014)  Tissue Pathology Exam (01/30/2020 08:50)  Comprehensive Metabolic Panel (01/21/2020 09:49)      Vital Signs:   /76   Pulse 76   Temp 97.1 °F (36.2 °C) (Temporal)   Resp 16   Ht 168.9 cm (66.5\")   Wt (!) 137 kg (302 lb)   SpO2 98% Comment: With oxygen  BMI 48.01 kg/m²     Body mass index is 48.01 kg/m².     Physical Exam  Vitals signs reviewed.   Constitutional:       Appearance: Normal appearance.   Abdominal:      General: Bowel sounds are normal. There is no distension.      Palpations: Abdomen is soft. Abdomen is not rigid. There is no mass or pulsatile mass.      Tenderness: There is abdominal tenderness in the right upper quadrant. There is no guarding or rebound.             Assessment and Plan      Diagnoses and all orders for this visit:    1. Gastroparesis (Primary)  Comments:  This is a chronic stable problem.    2. Right upper quadrant pain  Comments:  Patient has new and worsening right " upper quadrant pain that is undiagnosed.  She is status post cholecystectomy.  She does have a history of fatty liver.    3. Fatty liver  Comments:  Is a chronic stable problem.    4. Nausea    5. Diverticulosis of colon  Comments:  This is chronic and patient has been advised to maintain a high-fiber diet.    6. Elevated liver enzymes  Comments:  This is a chronic problem related to fatty liver disease.    Other orders  -     US Liver; Future         Brief Summary  We will proceed with an ultrasound the right upper quadrant to evaluate her new right upper quadrant pain.  She will continue Nexium for GERD and will continue small frequent meals for gastroparesis.  She also continue Bentyl for IBS.  Follow-up after the ultrasound.       Documentation by Deb HUFFMAN acting as a scribe in the following sections (HPI, Assessment, Plan) for the undersigned provider.       Follow Up   No follow-ups on file.     Patient Instructions   1. For further work up of right upper quadrant abdominal pain, we will order a liver u/s given your history of fatty liver and elevated liver enzymes.     2. For GERD, continue daily acid suppressive medication.     3. Additional recommendations pending outcome of u/s.

## 2021-02-03 ENCOUNTER — OFFICE VISIT (OUTPATIENT)
Dept: GASTROENTEROLOGY | Facility: CLINIC | Age: 46
End: 2021-02-03

## 2021-02-03 VITALS
BODY MASS INDEX: 45.99 KG/M2 | RESPIRATION RATE: 16 BRPM | HEIGHT: 67 IN | WEIGHT: 293 LBS | SYSTOLIC BLOOD PRESSURE: 130 MMHG | DIASTOLIC BLOOD PRESSURE: 76 MMHG | OXYGEN SATURATION: 98 % | HEART RATE: 76 BPM | TEMPERATURE: 97.1 F

## 2021-02-03 DIAGNOSIS — K76.0 FATTY LIVER: Chronic | ICD-10-CM

## 2021-02-03 DIAGNOSIS — R10.11 RIGHT UPPER QUADRANT PAIN: ICD-10-CM

## 2021-02-03 DIAGNOSIS — K31.84 GASTROPARESIS: Primary | Chronic | ICD-10-CM

## 2021-02-03 DIAGNOSIS — R11.0 NAUSEA: ICD-10-CM

## 2021-02-03 DIAGNOSIS — R74.8 ELEVATED LIVER ENZYMES: Chronic | ICD-10-CM

## 2021-02-03 DIAGNOSIS — K57.30 DIVERTICULOSIS OF COLON: Chronic | ICD-10-CM

## 2021-02-03 PROCEDURE — 99214 OFFICE O/P EST MOD 30 MIN: CPT | Performed by: INTERNAL MEDICINE

## 2021-02-03 RX ORDER — LEVOTHYROXINE SODIUM 0.1 MG/1
200 TABLET ORAL DAILY
COMMUNITY
Start: 2020-12-17 | End: 2021-03-12

## 2021-02-03 RX ORDER — OMEPRAZOLE 40 MG/1
40 CAPSULE, DELAYED RELEASE ORAL 2 TIMES DAILY
COMMUNITY
End: 2021-03-12

## 2021-02-03 RX ORDER — BUMETANIDE 2 MG/1
2 TABLET ORAL 2 TIMES DAILY
COMMUNITY
Start: 2020-12-17

## 2021-02-03 RX ORDER — LORATADINE 10 MG/1
10 TABLET ORAL DAILY
COMMUNITY
Start: 2020-11-19 | End: 2022-10-10

## 2021-02-03 RX ORDER — POTASSIUM CHLORIDE 750 MG/1
10 TABLET, FILM COATED, EXTENDED RELEASE ORAL 2 TIMES DAILY
COMMUNITY
Start: 2020-11-09

## 2021-02-03 RX ORDER — DICYCLOMINE HYDROCHLORIDE 10 MG/1
10 CAPSULE ORAL 3 TIMES DAILY
COMMUNITY
Start: 2020-12-31

## 2021-02-03 NOTE — PATIENT INSTRUCTIONS
1. For further work up of right upper quadrant abdominal pain, we will order a liver u/s given your history of fatty liver and elevated liver enzymes.     2. For GERD, continue daily acid suppressive medication.     3. Additional recommendations pending outcome of u/s.

## 2021-02-19 ENCOUNTER — HOSPITAL ENCOUNTER (OUTPATIENT)
Dept: ULTRASOUND IMAGING | Facility: HOSPITAL | Age: 46
Discharge: HOME | End: 2021-02-19
Admitting: INTERNAL MEDICINE

## 2021-02-19 DIAGNOSIS — R10.11 RIGHT UPPER QUADRANT PAIN: ICD-10-CM

## 2021-02-19 DIAGNOSIS — R74.8 ELEVATED LIVER ENZYMES: Chronic | ICD-10-CM

## 2021-02-19 DIAGNOSIS — K76.0 FATTY LIVER: Chronic | ICD-10-CM

## 2021-02-19 PROCEDURE — 76705 ECHO EXAM OF ABDOMEN: CPT

## 2021-03-11 NOTE — PROGRESS NOTES
"Chief Complaint   Patient presents with   • Abdominal Pain   • GI Problem           History of Present Illness  Patient is a 46-year-old female who presents today for follow-up. She has a history of gastroparesis and diverticulitis status post resection. She had a prolonged hospital stay due to COVID-19 infection. Also history of fatty liver and IBS.    She had a liver ultrasound performed February 2021 that showed fatty liver but was otherwise normal.    Patient presents today for follow-up.  She presents today with concerns about worsening gastroparesis symptoms.  She has been experiencing nausea, vomiting, abdominal bloating and distention, and pain to her upper abdomen.  She feels symptoms have worsened over the last few months.  She reports she believes she is having difficulty digesting her medications as her thyroid has been out of control.    She continues on Nexium for GERD.  Reports symptoms are reasonably well controlled with use of this medication.     She continues on dicyclomine for IBS symptoms.  She is generally having a daily bowel movement.  Denies any issues with diarrhea currently.  Denies any rectal bleeding.        Review of Systems   Respiratory: Positive for shortness of breath.    Gastrointestinal: Positive for abdominal distention, abdominal pain, nausea and vomiting.        Result Review :       Tissue Pathology Exam (01/30/2020 08:50)   SCANNED - COLONOSCOPY (06/13/2017)   ENDOSCOPY, INT (01/30/2020)   US Liver (02/19/2021 09:10)   Office Visit with Batsheva Simmons APRN (02/12/2020)   Office Visit with Nitesh Benoit MD (02/03/2021)       Vital Signs:   /82   Pulse 82   Temp 98 °F (36.7 °C)   Ht 168.9 cm (66.5\")   Wt (!) 140 kg (308 lb)   SpO2 96%   BMI 48.97 kg/m²     Body mass index is 48.97 kg/m².     Physical Exam  Vitals reviewed.   Constitutional:       General: She is not in acute distress.     Appearance: She is well-developed.   HENT:      Head: Normocephalic " and atraumatic.   Cardiovascular:      Rate and Rhythm: Normal rate and regular rhythm.   Pulmonary:      Effort: Pulmonary effort is normal. No respiratory distress.      Breath sounds: Normal breath sounds.   Abdominal:      General: Abdomen is protuberant.      Palpations: Abdomen is soft.      Tenderness: There is abdominal tenderness in the epigastric area.   Skin:     General: Skin is dry.      Coloration: Skin is not pale.   Neurological:      Mental Status: She is alert and oriented to person, place, and time.   Psychiatric:         Thought Content: Thought content normal.           Assessment and Plan    Diagnoses and all orders for this visit:    1. Gastroparesis (Primary)    2. Gastroesophageal reflux disease without esophagitis    3. Irritable bowel syndrome, unspecified type    4. Fatty liver    Other orders  -     erythromycin base (E-MYCIN) 250 MG tablet; Take 1 tablet by mouth 3 (Three) Times a Day Before Meals.  Dispense: 90 tablet; Refill: 0         Discussion  Patient presents today with concerns about worsening gastroparesis symptoms.  Will provide course of erythromycin for prognosis and dietary modifications to help with this was reinforced today.  We will also refer to [facility] motility clinic for further evaluation and to discuss possible gastric stimulator placement.    GERD and IBS remained stable and will continue current treatment.    Recent ultrasound showed fatty liver.  Dietary and lifestyle modifications to help with this were reviewed today.    If symptoms do not improve, we may consider trial of Xifaxan for possible small intestinal bacterial overgrowth.          Follow Up   Return if symptoms worsen or fail to improve.    Patient Instructions   For gastroparesis, begin taking erythromycin as prescribed.  Prescription sent to pharmacy.    For gastroparesis, we will refer to [facility] motility center.    For gastroparesis, follow a gastroparesis diet. Eat smaller more frequent meals as  opposed to large meals and foods lower in fat and fiber.    For GERD, continue esomeprazole daily as prescribed.    For IBS, continue dicyclomine as needed.    For fatty liver, weight loss is recommended. Recommend following a low fat and low sugar diet. Recommend management of diabetes and elevated cholesterol with primary care provider if indicated. Regular exercise is recommended. Alcohol avoidance is recommended.

## 2021-03-12 ENCOUNTER — OFFICE VISIT (OUTPATIENT)
Dept: GASTROENTEROLOGY | Facility: CLINIC | Age: 46
End: 2021-03-12

## 2021-03-12 VITALS
HEART RATE: 82 BPM | TEMPERATURE: 98 F | BODY MASS INDEX: 47.09 KG/M2 | DIASTOLIC BLOOD PRESSURE: 82 MMHG | SYSTOLIC BLOOD PRESSURE: 132 MMHG | HEIGHT: 66 IN | WEIGHT: 293 LBS | OXYGEN SATURATION: 96 %

## 2021-03-12 DIAGNOSIS — K58.9 IRRITABLE BOWEL SYNDROME, UNSPECIFIED TYPE: ICD-10-CM

## 2021-03-12 DIAGNOSIS — K31.84 GASTROPARESIS: Primary | ICD-10-CM

## 2021-03-12 DIAGNOSIS — K76.0 FATTY LIVER: ICD-10-CM

## 2021-03-12 DIAGNOSIS — K21.9 GASTROESOPHAGEAL REFLUX DISEASE WITHOUT ESOPHAGITIS: ICD-10-CM

## 2021-03-12 PROCEDURE — 99214 OFFICE O/P EST MOD 30 MIN: CPT | Performed by: NURSE PRACTITIONER

## 2021-03-12 RX ORDER — CYCLOSPORINE 0.5 MG/ML
EMULSION OPHTHALMIC EVERY 12 HOURS
COMMUNITY
Start: 2021-03-04 | End: 2022-10-10

## 2021-03-12 RX ORDER — ERYTHROMYCIN 250 MG/1
250 TABLET, COATED ORAL
Qty: 90 TABLET | Refills: 0 | Status: SHIPPED | OUTPATIENT
Start: 2021-03-12 | End: 2022-01-06

## 2021-03-12 NOTE — PATIENT INSTRUCTIONS
For gastroparesis, begin taking erythromycin as prescribed.  Prescription sent to pharmacy.    For gastroparesis, we will refer to [facility] motility center.    For gastroparesis, follow a gastroparesis diet. Eat smaller more frequent meals as opposed to large meals and foods lower in fat and fiber.    For GERD, continue esomeprazole daily as prescribed.    For IBS, continue dicyclomine as needed.    For fatty liver, weight loss is recommended. Recommend following a low fat and low sugar diet. Recommend management of diabetes and elevated cholesterol with primary care provider if indicated. Regular exercise is recommended. Alcohol avoidance is recommended.

## 2022-01-06 ENCOUNTER — PREP FOR SURGERY (OUTPATIENT)
Dept: SURGERY | Facility: SURGERY CENTER | Age: 47
End: 2022-01-06

## 2022-01-06 ENCOUNTER — OFFICE VISIT (OUTPATIENT)
Dept: GASTROENTEROLOGY | Facility: CLINIC | Age: 47
End: 2022-01-06

## 2022-01-06 VITALS
HEIGHT: 67 IN | SYSTOLIC BLOOD PRESSURE: 120 MMHG | BODY MASS INDEX: 45.99 KG/M2 | TEMPERATURE: 96.8 F | HEART RATE: 80 BPM | DIASTOLIC BLOOD PRESSURE: 72 MMHG | OXYGEN SATURATION: 95 % | WEIGHT: 293 LBS

## 2022-01-06 DIAGNOSIS — R10.11 RIGHT UPPER QUADRANT ABDOMINAL PAIN: Primary | ICD-10-CM

## 2022-01-06 DIAGNOSIS — K31.84 GASTROPARESIS: ICD-10-CM

## 2022-01-06 DIAGNOSIS — R11.0 NAUSEA: ICD-10-CM

## 2022-01-06 DIAGNOSIS — Z87.19 HISTORY OF DIVERTICULITIS: ICD-10-CM

## 2022-01-06 DIAGNOSIS — R10.13 EPIGASTRIC PAIN: ICD-10-CM

## 2022-01-06 PROCEDURE — 99214 OFFICE O/P EST MOD 30 MIN: CPT | Performed by: NURSE PRACTITIONER

## 2022-01-06 RX ORDER — SODIUM CHLORIDE 0.9 % (FLUSH) 0.9 %
10 SYRINGE (ML) INJECTION AS NEEDED
Status: CANCELLED | OUTPATIENT
Start: 2022-01-06

## 2022-01-06 RX ORDER — SODIUM CHLORIDE, SODIUM LACTATE, POTASSIUM CHLORIDE, CALCIUM CHLORIDE 600; 310; 30; 20 MG/100ML; MG/100ML; MG/100ML; MG/100ML
30 INJECTION, SOLUTION INTRAVENOUS CONTINUOUS PRN
Status: CANCELLED | OUTPATIENT
Start: 2022-01-06

## 2022-01-06 RX ORDER — SODIUM CHLORIDE 0.9 % (FLUSH) 0.9 %
3 SYRINGE (ML) INJECTION EVERY 12 HOURS SCHEDULED
Status: CANCELLED | OUTPATIENT
Start: 2022-01-06

## 2022-01-06 NOTE — PATIENT INSTRUCTIONS
Schedule EGD for further evaluation of symptoms.    Schedule CT abdomen pelvis with contrast for further evaluation of symptoms.

## 2022-02-04 ENCOUNTER — APPOINTMENT (OUTPATIENT)
Dept: CT IMAGING | Facility: HOSPITAL | Age: 47
End: 2022-02-04

## 2022-03-11 PROBLEM — R10.11 RIGHT UPPER QUADRANT ABDOMINAL PAIN: Status: ACTIVE | Noted: 2022-03-11

## 2022-03-11 PROBLEM — R10.13 EPIGASTRIC PAIN: Status: ACTIVE | Noted: 2022-03-11

## 2022-03-17 ENCOUNTER — TRANSCRIBE ORDERS (OUTPATIENT)
Dept: ADMINISTRATIVE | Facility: HOSPITAL | Age: 47
End: 2022-03-17

## 2022-03-17 DIAGNOSIS — Z01.812 PRE-OPERATIVE LABORATORY EXAMINATION: Primary | ICD-10-CM

## 2022-03-21 ENCOUNTER — LAB (OUTPATIENT)
Dept: LAB | Facility: HOSPITAL | Age: 47
End: 2022-03-21

## 2022-03-21 DIAGNOSIS — Z01.812 PRE-OPERATIVE LABORATORY EXAMINATION: ICD-10-CM

## 2022-03-21 LAB — SARS-COV-2 RNA PNL SPEC NAA+PROBE: NOT DETECTED

## 2022-03-21 PROCEDURE — C9803 HOPD COVID-19 SPEC COLLECT: HCPCS

## 2022-03-21 PROCEDURE — 87635 SARS-COV-2 COVID-19 AMP PRB: CPT | Performed by: INTERNAL MEDICINE

## 2022-03-22 ENCOUNTER — ANESTHESIA EVENT (OUTPATIENT)
Dept: PERIOP | Facility: HOSPITAL | Age: 47
End: 2022-03-22

## 2022-03-22 RX ORDER — ATORVASTATIN CALCIUM 10 MG/1
10 TABLET, FILM COATED ORAL DAILY
COMMUNITY

## 2022-03-22 RX ORDER — AMLODIPINE BESYLATE 5 MG/1
5 TABLET ORAL DAILY
COMMUNITY
End: 2022-10-10

## 2022-03-22 RX ORDER — GLIPIZIDE 5 MG/1
5 TABLET ORAL DAILY
COMMUNITY

## 2022-03-23 ENCOUNTER — HOSPITAL ENCOUNTER (OUTPATIENT)
Facility: HOSPITAL | Age: 47
Setting detail: HOSPITAL OUTPATIENT SURGERY
Discharge: HOME | End: 2022-03-23
Attending: INTERNAL MEDICINE | Admitting: INTERNAL MEDICINE

## 2022-03-23 ENCOUNTER — ANESTHESIA (OUTPATIENT)
Dept: PERIOP | Facility: HOSPITAL | Age: 47
End: 2022-03-23

## 2022-03-23 VITALS
RESPIRATION RATE: 14 BRPM | BODY MASS INDEX: 47.09 KG/M2 | HEART RATE: 68 BPM | SYSTOLIC BLOOD PRESSURE: 114 MMHG | OXYGEN SATURATION: 99 % | HEIGHT: 66 IN | TEMPERATURE: 97.5 F | DIASTOLIC BLOOD PRESSURE: 79 MMHG | WEIGHT: 293 LBS

## 2022-03-23 DIAGNOSIS — R10.11 RIGHT UPPER QUADRANT ABDOMINAL PAIN: ICD-10-CM

## 2022-03-23 DIAGNOSIS — R10.13 EPIGASTRIC PAIN: ICD-10-CM

## 2022-03-23 DIAGNOSIS — R11.0 NAUSEA: ICD-10-CM

## 2022-03-23 LAB
GLUCOSE BLDC GLUCOMTR-MCNC: 152 MG/DL (ref 70–130)
QT INTERVAL: 434 MS

## 2022-03-23 PROCEDURE — 43239 EGD BIOPSY SINGLE/MULTIPLE: CPT | Performed by: INTERNAL MEDICINE

## 2022-03-23 PROCEDURE — 82962 GLUCOSE BLOOD TEST: CPT

## 2022-03-23 PROCEDURE — 88342 IMHCHEM/IMCYTCHM 1ST ANTB: CPT | Performed by: INTERNAL MEDICINE

## 2022-03-23 PROCEDURE — 93010 ELECTROCARDIOGRAM REPORT: CPT | Performed by: INTERNAL MEDICINE

## 2022-03-23 PROCEDURE — 25010000002 PROPOFOL 10 MG/ML EMULSION: Performed by: NURSE ANESTHETIST, CERTIFIED REGISTERED

## 2022-03-23 PROCEDURE — 93005 ELECTROCARDIOGRAM TRACING: CPT | Performed by: NURSE ANESTHETIST, CERTIFIED REGISTERED

## 2022-03-23 PROCEDURE — 88305 TISSUE EXAM BY PATHOLOGIST: CPT | Performed by: INTERNAL MEDICINE

## 2022-03-23 RX ORDER — SODIUM CHLORIDE 0.9 % (FLUSH) 0.9 %
10 SYRINGE (ML) INJECTION AS NEEDED
Status: DISCONTINUED | OUTPATIENT
Start: 2022-03-23 | End: 2022-03-23 | Stop reason: HOSPADM

## 2022-03-23 RX ORDER — LIDOCAINE HYDROCHLORIDE 20 MG/ML
INJECTION, SOLUTION INFILTRATION; PERINEURAL AS NEEDED
Status: DISCONTINUED | OUTPATIENT
Start: 2022-03-23 | End: 2022-03-23 | Stop reason: SURG

## 2022-03-23 RX ORDER — PROPOFOL 10 MG/ML
VIAL (ML) INTRAVENOUS AS NEEDED
Status: DISCONTINUED | OUTPATIENT
Start: 2022-03-23 | End: 2022-03-23 | Stop reason: SURG

## 2022-03-23 RX ORDER — SODIUM CHLORIDE 0.9 % (FLUSH) 0.9 %
3 SYRINGE (ML) INJECTION EVERY 12 HOURS SCHEDULED
Status: DISCONTINUED | OUTPATIENT
Start: 2022-03-23 | End: 2022-03-23 | Stop reason: HOSPADM

## 2022-03-23 RX ORDER — GLYCOPYRROLATE 0.2 MG/ML
INJECTION INTRAMUSCULAR; INTRAVENOUS AS NEEDED
Status: DISCONTINUED | OUTPATIENT
Start: 2022-03-23 | End: 2022-03-23 | Stop reason: SURG

## 2022-03-23 RX ORDER — SODIUM CHLORIDE, SODIUM LACTATE, POTASSIUM CHLORIDE, CALCIUM CHLORIDE 600; 310; 30; 20 MG/100ML; MG/100ML; MG/100ML; MG/100ML
9 INJECTION, SOLUTION INTRAVENOUS CONTINUOUS
Status: DISCONTINUED | OUTPATIENT
Start: 2022-03-23 | End: 2022-03-23 | Stop reason: HOSPADM

## 2022-03-23 RX ORDER — SODIUM CHLORIDE, SODIUM LACTATE, POTASSIUM CHLORIDE, CALCIUM CHLORIDE 600; 310; 30; 20 MG/100ML; MG/100ML; MG/100ML; MG/100ML
30 INJECTION, SOLUTION INTRAVENOUS CONTINUOUS PRN
Status: DISCONTINUED | OUTPATIENT
Start: 2022-03-23 | End: 2022-03-23 | Stop reason: HOSPADM

## 2022-03-23 RX ORDER — SODIUM CHLORIDE 9 MG/ML
40 INJECTION, SOLUTION INTRAVENOUS AS NEEDED
Status: DISCONTINUED | OUTPATIENT
Start: 2022-03-23 | End: 2022-03-23 | Stop reason: HOSPADM

## 2022-03-23 RX ORDER — SODIUM CHLORIDE 0.9 % (FLUSH) 0.9 %
10 SYRINGE (ML) INJECTION EVERY 12 HOURS SCHEDULED
Status: DISCONTINUED | OUTPATIENT
Start: 2022-03-23 | End: 2022-03-23 | Stop reason: HOSPADM

## 2022-03-23 RX ORDER — LIDOCAINE HYDROCHLORIDE 10 MG/ML
0.5 INJECTION, SOLUTION EPIDURAL; INFILTRATION; INTRACAUDAL; PERINEURAL ONCE AS NEEDED
Status: DISCONTINUED | OUTPATIENT
Start: 2022-03-23 | End: 2022-03-23 | Stop reason: HOSPADM

## 2022-03-23 RX ADMIN — PROPOFOL 50 MG: 10 INJECTION, EMULSION INTRAVENOUS at 13:09

## 2022-03-23 RX ADMIN — LIDOCAINE HYDROCHLORIDE 100 MG: 20 INJECTION, SOLUTION INFILTRATION; PERINEURAL at 13:06

## 2022-03-23 RX ADMIN — GLYCOPYRROLATE 0.2 MG: 0.2 INJECTION INTRAMUSCULAR; INTRAVENOUS at 13:06

## 2022-03-23 RX ADMIN — PROPOFOL 50 MG: 10 INJECTION, EMULSION INTRAVENOUS at 13:06

## 2022-03-23 RX ADMIN — PROPOFOL 50 MG: 10 INJECTION, EMULSION INTRAVENOUS at 13:07

## 2022-03-23 RX ADMIN — SODIUM CHLORIDE, POTASSIUM CHLORIDE, SODIUM LACTATE AND CALCIUM CHLORIDE 30 ML/HR: 600; 310; 30; 20 INJECTION, SOLUTION INTRAVENOUS at 12:42

## 2022-03-23 NOTE — ANESTHESIA PREPROCEDURE EVALUATION
Anesthesia Evaluation     Patient summary reviewed and Nursing notes reviewed   history of anesthetic complications: PONV  NPO Solid Status: > 8 hours  NPO Liquid Status: > 8 hours           Airway   Mallampati: II  TM distance: >3 FB  Neck ROM: full  Possible difficult intubation  Dental      Pulmonary     breath sounds clear to auscultation  (+) a smoker (quit 12yrs ago) Former, COPD, asthma (pt states only when shes sick),home oxygen (2liters with activity and 3liters while sleeping ), sleep apnea (does not use cpap),   Cardiovascular   Exercise tolerance: good (4-7 METS)    Rhythm: regular  Rate: normal    (+) hypertension well controlled less than 2 medications, DVT resolved, hyperlipidemia,       Neuro/Psych  (+) weakness (generalized ), psychiatric history Anxiety,    GI/Hepatic/Renal/Endo    (+) morbid obesity, GERD poorly controlled,  liver disease fatty liver disease, renal disease stones, diabetes mellitus (pt states A1C is 7) type 2, thyroid problem hypothyroidism    ROS Comment: Irritable bowel syndrome  Gastris     Musculoskeletal     (+) back pain, chronic pain,   Abdominal   (+) obese,    Substance History - negative use     OB/GYN          Other - negative ROS       ROS/Med Hx Other: Had covid in 2020 pt states muscle weakness from that was in hospital for 63days on vent and echmo.  Pt had it again in January.                  Anesthesia Plan    ASA 3     MAC     intravenous induction     Anesthetic plan, all risks, benefits, and alternatives have been provided, discussed and informed consent has been obtained with: patient.  Use of blood products discussed with patient  Consented to blood products.       CODE STATUS:

## 2022-03-23 NOTE — H&P
No chief complaint on file.      HPI  Patient today for an EGD due to epigastric pain and GERD.         Problem List:    Patient Active Problem List   Diagnosis   • Anxiety   • Chronic low back pain   • COPD (chronic obstructive pulmonary disease) ([facility])   • Fibromyalgia   • Gastroesophageal reflux disease   • HTN (hypertension)   • Thyroid disease   • Irritable bowel syndrome   • Nausea   • Left upper quadrant pain   • Right upper quadrant abdominal pain   • Epigastric pain       Medical History:    Past Medical History:   Diagnosis Date   • Asthma    • COPD (chronic obstructive pulmonary disease) ([facility])    • COVID-19 2020    hospitalized until 20   • Diabetes mellitus ([facility])    • Diverticulitis of colon    • Fibromyalgia    • Gastritis    • GERD (gastroesophageal reflux disease)    • Hyperlipidemia    • Hypertension    • Irritable bowel syndrome         Social History:    Social History     Socioeconomic History   • Marital status: Legally    Tobacco Use   • Smoking status: Former Smoker     Quit date: 3/22/2010     Years since quittin.0   • Smokeless tobacco: Never Used   Vaping Use   • Vaping Use: Never used   Substance and Sexual Activity   • Alcohol use: Never   • Drug use: Never   • Sexual activity: Never       Family History:   Family History   Problem Relation Age of Onset   • Heart attack Mother    • Diverticulosis Mother    • Colon cancer Father    • Heart attack Father        Surgical History:   Past Surgical History:   Procedure Laterality Date   • ABDOMINAL SURGERY     • AXILLARY ABCESS IRRIGATION AND DEBRIDEMENT     • CHOLECYSTECTOMY     • COLON RESECTION     • COLONOSCOPY     • HYSTERECTOMY     • SINUS SURGERY      3   • THYROIDECTOMY     • TONSILLECTOMY     • UPPER GASTROINTESTINAL ENDOSCOPY  2020         Current Facility-Administered Medications:   •  lactated ringers infusion, 9 mL/hr, Intravenous, Continuous, Parish Greenberg CRNA  •  lactated ringers infusion, 30  "mL/hr, Intravenous, Continuous PRN, Troy, Batsheva G, APRN  •  lidocaine PF 1% (XYLOCAINE) injection 0.5 mL, 0.5 mL, Injection, Once PRN, Parish Greenberg CRNA  •  sodium chloride 0.9 % flush 10 mL, 10 mL, Intravenous, PRN, Parish Greenberg CRNA  •  sodium chloride 0.9 % flush 10 mL, 10 mL, Intravenous, Q12H, Parish Greenberg CRNA  •  sodium chloride 0.9 % flush 10 mL, 10 mL, Intravenous, PRN, Troy, Batsheva G, APRN  •  sodium chloride 0.9 % flush 3 mL, 3 mL, Intravenous, Q12H, Troy, Batsheva G, APRN  •  sodium chloride 0.9 % infusion 40 mL, 40 mL, Intravenous, PRN, Parish Greenberg CRNA    Allergies:   Allergies   Allergen Reactions   • Dilaudid  [Hydromorphone Hcl] Palpitations, GI Intolerance and Shortness Of Breath   • Statins GI Intolerance and Other (See Comments)   • Ondansetron Hcl GI Intolerance   • Adhesive Tape Hives   • Butorphanol Itching          Review of Systems   Pertinent items are noted in HPI      The following portions of the patient's history were reviewed by me and updated as appropriate: review of systems, allergies, current medications, past family history, past medical history, past social history, past surgical history and problem list.    Temp 98.1 °F (36.7 °C) (Oral)   Ht 167.6 cm (66\")   Wt (!) 142 kg (313 lb 9.6 oz)   BMI 50.62 kg/m²     PHYSICAL EXAM:    CONSTITUTIONAL:  today's vital signs reviewed by me  GASTROINTESTINAL: abdomen is soft nontender nondistended with normal active bowel sounds, no masses are appreciated    Debilities: None  Emotional Behavior: Appropriate    Assessment/ Plan  We will proceed today with EGD.    Risks and benefits as well as alternatives to endoscopic evaluation were explained to the patient and they voiced understanding and wish to proceed.  These risks include but are not limited to the risk of bleeding, perforation, adverse reaction to sedation, and missed lesions.  The patient was given the opportunity to ask questions prior to " the endoscopic procedure.

## 2022-03-30 LAB
LAB AP CASE REPORT: NORMAL
PATH REPORT.ADDENDUM SPEC: NORMAL
PATH REPORT.FINAL DX SPEC: NORMAL
PATH REPORT.GROSS SPEC: NORMAL

## 2022-05-20 ENCOUNTER — OFFICE VISIT (OUTPATIENT)
Dept: GASTROENTEROLOGY | Facility: CLINIC | Age: 47
End: 2022-05-20

## 2022-05-20 VITALS
WEIGHT: 293 LBS | DIASTOLIC BLOOD PRESSURE: 70 MMHG | SYSTOLIC BLOOD PRESSURE: 126 MMHG | BODY MASS INDEX: 45.99 KG/M2 | HEART RATE: 56 BPM | HEIGHT: 67 IN | TEMPERATURE: 98.2 F

## 2022-05-20 DIAGNOSIS — K31.84 GASTROPARESIS: ICD-10-CM

## 2022-05-20 DIAGNOSIS — R11.0 NAUSEA: ICD-10-CM

## 2022-05-20 DIAGNOSIS — K58.9 IRRITABLE BOWEL SYNDROME, UNSPECIFIED TYPE: ICD-10-CM

## 2022-05-20 DIAGNOSIS — R10.11 RIGHT UPPER QUADRANT ABDOMINAL PAIN: Primary | ICD-10-CM

## 2022-05-20 PROCEDURE — 99214 OFFICE O/P EST MOD 30 MIN: CPT | Performed by: NURSE PRACTITIONER

## 2022-05-20 NOTE — PROGRESS NOTES
"Chief Complaint   Patient presents with   • Follow-up         History of Present Illness  Patient is a 47-year-old female who presents today for follow-up. She has a history of gastroparesis, diverticulitis status postresection, fatty liver, and IBS.  She had an EGD March 2022 which was normal.  CT scan had been recommended, results were not available for review.    Since her EGD she continues to have constant sharp and cramping RUQ abdominal pain with nausea without vomiting.      Reports abdominal bloating and describes bowel habits as occurring every other day without melena or hematochezia.    She is currently taking daily dicyclomine and hydrocodone with minimal change in pain.     Denies fever/chills.     States that she had CT scan performed at [facility], reports not available at time of visit.     She has had a cholecystectomy.     Result Review :       Tissue Pathology Exam (03/23/2022 13:06)   UPPER GI ENDOSCOPY (03/23/2022 12:56)   Office Visit with Batsheva Simmons APRN (01/06/2022)   Office Visit with Batsheva Simmons APRN (03/12/2021)   Tissue Pathology Exam (01/30/2020 08:50)   SCANNED - COLONOSCOPY (06/13/2017)   ENDOSCOPY, INT (01/30/2020)   US Liver (02/19/2021 09:10)   Office Visit with Batsheva Simmons APRN (02/12/2020)   Office Visit with Nitesh Benoit MD (02/03/2021)       Vital Signs:   /70   Pulse 56   Temp 98.2 °F (36.8 °C)   Ht 168.9 cm (66.5\")   Wt (!) 142 kg (313 lb)   BMI 49.76 kg/m²     Body mass index is 49.76 kg/m².     Physical Exam  Vitals reviewed.   Constitutional:       General: She is not in acute distress.     Appearance: She is well-developed.   HENT:      Head: Normocephalic and atraumatic.   Pulmonary:      Effort: Pulmonary effort is normal. No respiratory distress.   Abdominal:      General: Abdomen is protuberant. Bowel sounds are normal.      Palpations: Abdomen is soft.      Tenderness: There is abdominal tenderness in the right upper quadrant. "   Skin:     General: Skin is dry.      Coloration: Skin is not pale.   Neurological:      Mental Status: She is alert and oriented to person, place, and time.   Psychiatric:         Thought Content: Thought content normal.           Assessment and Plan    Diagnoses and all orders for this visit:    1. Right upper quadrant abdominal pain (Primary)  -     CBC & Differential  -     Comprehensive Metabolic Panel  -     riFAXIMin (Xifaxan) 550 MG tablet; Take 1 tablet by mouth 3 (Three) Times a Day for 14 days.  Dispense: 42 tablet; Refill: 0    2. Nausea  -     CBC & Differential  -     Comprehensive Metabolic Panel  -     riFAXIMin (Xifaxan) 550 MG tablet; Take 1 tablet by mouth 3 (Three) Times a Day for 14 days.  Dispense: 42 tablet; Refill: 0    3. Irritable bowel syndrome, unspecified type  -     CBC & Differential  -     Comprehensive Metabolic Panel  -     riFAXIMin (Xifaxan) 550 MG tablet; Take 1 tablet by mouth 3 (Three) Times a Day for 14 days.  Dispense: 42 tablet; Refill: 0    4. Gastroparesis         Discussion  Patient presents today for follow-up with concerns about persistent worsening right upper quadrant pain.  We will check lab work today and attempt to obtain copy of CT scan for review.    For abdominal pain, patient to continue dicyclomine as needed we will add in for potential component of irritable bowel syndrome and possible small intestinal bacterial overgrowth which she would be at risk for due to her history of gastroparesis.    Symptoms also could be secondary to fatty liver and distention of liver capsule.          Follow Up   Return if symptoms worsen or fail to improve.    Patient Instructions   Blood work today to assess abdominal pain     Once we receive these results, our office will contact you to discuss updating your treatment plan as indicated

## 2022-05-20 NOTE — PATIENT INSTRUCTIONS
Blood work today to assess abdominal pain     Once we receive these results, our office will contact you to discuss updating your treatment plan as indicated

## 2022-05-21 LAB
ALBUMIN SERPL-MCNC: 4.5 G/DL (ref 3.8–4.8)
ALBUMIN/GLOB SERPL: 1.3 {RATIO} (ref 1.2–2.2)
ALP SERPL-CCNC: 119 IU/L (ref 44–121)
ALT SERPL-CCNC: 37 IU/L (ref 0–32)
AST SERPL-CCNC: 61 IU/L (ref 0–40)
BASOPHILS # BLD AUTO: 0.1 X10E3/UL (ref 0–0.2)
BASOPHILS NFR BLD AUTO: 2 %
BILIRUB SERPL-MCNC: 0.4 MG/DL (ref 0–1.2)
BUN SERPL-MCNC: 18 MG/DL (ref 6–24)
BUN/CREAT SERPL: 16 (ref 9–23)
CALCIUM SERPL-MCNC: 9 MG/DL (ref 8.7–10.2)
CHLORIDE SERPL-SCNC: 102 MMOL/L (ref 96–106)
CO2 SERPL-SCNC: 26 MMOL/L (ref 20–29)
CREAT SERPL-MCNC: 1.14 MG/DL (ref 0.57–1)
EGFRCR SERPLBLD CKD-EPI 2021: 60 ML/MIN/1.73
EOSINOPHIL # BLD AUTO: 0.3 X10E3/UL (ref 0–0.4)
EOSINOPHIL NFR BLD AUTO: 6 %
ERYTHROCYTE [DISTWIDTH] IN BLOOD BY AUTOMATED COUNT: 15 % (ref 11.7–15.4)
GLOBULIN SER CALC-MCNC: 3.4 G/DL (ref 1.5–4.5)
GLUCOSE SERPL-MCNC: 237 MG/DL (ref 65–99)
HCT VFR BLD AUTO: 33.6 % (ref 34–46.6)
HGB BLD-MCNC: 10.7 G/DL (ref 11.1–15.9)
IMM GRANULOCYTES # BLD AUTO: 0 X10E3/UL (ref 0–0.1)
IMM GRANULOCYTES NFR BLD AUTO: 0 %
LYMPHOCYTES # BLD AUTO: 1.3 X10E3/UL (ref 0.7–3.1)
LYMPHOCYTES NFR BLD AUTO: 25 %
MCH RBC QN AUTO: 31.1 PG (ref 26.6–33)
MCHC RBC AUTO-ENTMCNC: 31.8 G/DL (ref 31.5–35.7)
MCV RBC AUTO: 98 FL (ref 79–97)
MONOCYTES # BLD AUTO: 0.4 X10E3/UL (ref 0.1–0.9)
MONOCYTES NFR BLD AUTO: 7 %
NEUTROPHILS # BLD AUTO: 3.1 X10E3/UL (ref 1.4–7)
NEUTROPHILS NFR BLD AUTO: 60 %
PLATELET # BLD AUTO: 190 X10E3/UL (ref 150–450)
POTASSIUM SERPL-SCNC: 4.8 MMOL/L (ref 3.5–5.2)
PROT SERPL-MCNC: 7.9 G/DL (ref 6–8.5)
RBC # BLD AUTO: 3.44 X10E6/UL (ref 3.77–5.28)
SODIUM SERPL-SCNC: 143 MMOL/L (ref 134–144)
WBC # BLD AUTO: 5.1 X10E3/UL (ref 3.4–10.8)

## 2022-05-23 ENCOUNTER — PREP FOR SURGERY (OUTPATIENT)
Dept: SURGERY | Facility: SURGERY CENTER | Age: 47
End: 2022-05-23

## 2022-05-23 DIAGNOSIS — D64.9 ANEMIA, UNSPECIFIED TYPE: ICD-10-CM

## 2022-05-23 DIAGNOSIS — R10.11 RIGHT UPPER QUADRANT ABDOMINAL PAIN: Primary | ICD-10-CM

## 2022-05-23 DIAGNOSIS — R11.0 NAUSEA: ICD-10-CM

## 2022-05-23 DIAGNOSIS — Z87.19 HISTORY OF DIVERTICULITIS: ICD-10-CM

## 2022-05-23 RX ORDER — SODIUM CHLORIDE 0.9 % (FLUSH) 0.9 %
10 SYRINGE (ML) INJECTION AS NEEDED
Status: CANCELLED | OUTPATIENT
Start: 2022-05-23

## 2022-05-23 RX ORDER — SODIUM CHLORIDE 0.9 % (FLUSH) 0.9 %
3 SYRINGE (ML) INJECTION EVERY 12 HOURS SCHEDULED
Status: CANCELLED | OUTPATIENT
Start: 2022-05-23

## 2022-05-23 RX ORDER — SODIUM CHLORIDE, SODIUM LACTATE, POTASSIUM CHLORIDE, CALCIUM CHLORIDE 600; 310; 30; 20 MG/100ML; MG/100ML; MG/100ML; MG/100ML
30 INJECTION, SOLUTION INTRAVENOUS CONTINUOUS PRN
Status: CANCELLED | OUTPATIENT
Start: 2022-05-23

## 2022-06-14 ENCOUNTER — HOSPITAL ENCOUNTER (OUTPATIENT)
Dept: CT IMAGING | Facility: HOSPITAL | Age: 47
Discharge: HOME | End: 2022-06-14
Admitting: NURSE PRACTITIONER

## 2022-06-14 DIAGNOSIS — D64.9 ANEMIA, UNSPECIFIED TYPE: ICD-10-CM

## 2022-06-14 DIAGNOSIS — Z87.19 HISTORY OF DIVERTICULITIS: ICD-10-CM

## 2022-06-14 DIAGNOSIS — R11.0 NAUSEA: ICD-10-CM

## 2022-06-14 DIAGNOSIS — R10.11 RIGHT UPPER QUADRANT ABDOMINAL PAIN: ICD-10-CM

## 2022-06-14 PROCEDURE — 25010000002 IOPAMIDOL 61 % SOLUTION: Performed by: NURSE PRACTITIONER

## 2022-06-14 PROCEDURE — 0 DIATRIZOATE MEGLUMINE & SODIUM PER 1 ML: Performed by: NURSE PRACTITIONER

## 2022-06-14 PROCEDURE — 74177 CT ABD & PELVIS W/CONTRAST: CPT

## 2022-06-14 RX ADMIN — IOPAMIDOL 100 ML: 612 INJECTION, SOLUTION INTRAVENOUS at 18:48

## 2022-06-14 RX ADMIN — DIATRIZOATE MEGLUMINE AND DIATRIZOATE SODIUM 30 ML: 660; 100 LIQUID ORAL; RECTAL at 17:35

## 2022-06-15 DIAGNOSIS — K76.9 LIVER LESION: Primary | ICD-10-CM

## 2022-06-15 DIAGNOSIS — E27.9 LESION OF ADRENAL GLAND: ICD-10-CM

## 2022-06-23 ENCOUNTER — TELEPHONE (OUTPATIENT)
Dept: GASTROENTEROLOGY | Facility: CLINIC | Age: 47
End: 2022-06-23

## 2022-07-25 ENCOUNTER — APPOINTMENT (OUTPATIENT)
Dept: MRI IMAGING | Facility: HOSPITAL | Age: 47
End: 2022-07-25

## 2022-07-28 ENCOUNTER — APPOINTMENT (OUTPATIENT)
Dept: MRI IMAGING | Facility: HOSPITAL | Age: 47
End: 2022-07-28

## 2022-08-19 ENCOUNTER — TELEPHONE (OUTPATIENT)
Dept: GASTROENTEROLOGY | Facility: CLINIC | Age: 47
End: 2022-08-19

## 2022-08-19 NOTE — TELEPHONE ENCOUNTER
Pt called and would like her CLS prep sent to her email as she has not received any paperwork regarding her prep. Verified her email as [email or web address]. Please advise.

## 2022-10-10 RX ORDER — LOSARTAN POTASSIUM 50 MG/1
50 TABLET ORAL DAILY
COMMUNITY

## 2022-10-11 ENCOUNTER — ANESTHESIA EVENT (OUTPATIENT)
Dept: PERIOP | Facility: HOSPITAL | Age: 47
End: 2022-10-11

## 2022-10-12 ENCOUNTER — HOSPITAL ENCOUNTER (OUTPATIENT)
Facility: HOSPITAL | Age: 47
Setting detail: HOSPITAL OUTPATIENT SURGERY
Discharge: HOME | End: 2022-10-12
Attending: INTERNAL MEDICINE | Admitting: INTERNAL MEDICINE

## 2022-10-12 ENCOUNTER — ANESTHESIA (OUTPATIENT)
Dept: PERIOP | Facility: HOSPITAL | Age: 47
End: 2022-10-12

## 2022-10-12 VITALS
HEIGHT: 66 IN | RESPIRATION RATE: 18 BRPM | SYSTOLIC BLOOD PRESSURE: 151 MMHG | BODY MASS INDEX: 47.09 KG/M2 | HEART RATE: 64 BPM | DIASTOLIC BLOOD PRESSURE: 97 MMHG | OXYGEN SATURATION: 95 % | WEIGHT: 293 LBS | TEMPERATURE: 98 F

## 2022-10-12 DIAGNOSIS — R10.11 RIGHT UPPER QUADRANT ABDOMINAL PAIN: ICD-10-CM

## 2022-10-12 DIAGNOSIS — D64.9 ANEMIA, UNSPECIFIED TYPE: ICD-10-CM

## 2022-10-12 LAB
GLUCOSE BLDC GLUCOMTR-MCNC: 206 MG/DL (ref 70–130)
POTASSIUM SERPL-SCNC: 4.4 MMOL/L (ref 3.5–5.2)

## 2022-10-12 PROCEDURE — 84132 ASSAY OF SERUM POTASSIUM: CPT | Performed by: NURSE ANESTHETIST, CERTIFIED REGISTERED

## 2022-10-12 PROCEDURE — 45385 COLONOSCOPY W/LESION REMOVAL: CPT | Performed by: INTERNAL MEDICINE

## 2022-10-12 PROCEDURE — 45380 COLONOSCOPY AND BIOPSY: CPT | Performed by: INTERNAL MEDICINE

## 2022-10-12 PROCEDURE — 82962 GLUCOSE BLOOD TEST: CPT

## 2022-10-12 PROCEDURE — 88305 TISSUE EXAM BY PATHOLOGIST: CPT | Performed by: INTERNAL MEDICINE

## 2022-10-12 PROCEDURE — 25010000002 PROPOFOL 10 MG/ML EMULSION: Performed by: ANESTHESIOLOGY

## 2022-10-12 RX ORDER — SODIUM CHLORIDE 0.9 % (FLUSH) 0.9 %
3 SYRINGE (ML) INJECTION EVERY 12 HOURS SCHEDULED
Status: DISCONTINUED | OUTPATIENT
Start: 2022-10-12 | End: 2022-10-12 | Stop reason: HOSPADM

## 2022-10-12 RX ORDER — PROPOFOL 10 MG/ML
VIAL (ML) INTRAVENOUS AS NEEDED
Status: DISCONTINUED | OUTPATIENT
Start: 2022-10-12 | End: 2022-10-12 | Stop reason: SURG

## 2022-10-12 RX ORDER — SODIUM CHLORIDE, SODIUM LACTATE, POTASSIUM CHLORIDE, CALCIUM CHLORIDE 600; 310; 30; 20 MG/100ML; MG/100ML; MG/100ML; MG/100ML
30 INJECTION, SOLUTION INTRAVENOUS CONTINUOUS PRN
Status: DISCONTINUED | OUTPATIENT
Start: 2022-10-12 | End: 2022-10-12 | Stop reason: HOSPADM

## 2022-10-12 RX ORDER — SODIUM CHLORIDE 9 MG/ML
40 INJECTION, SOLUTION INTRAVENOUS AS NEEDED
Status: DISCONTINUED | OUTPATIENT
Start: 2022-10-12 | End: 2022-10-12 | Stop reason: HOSPADM

## 2022-10-12 RX ORDER — SODIUM CHLORIDE 0.9 % (FLUSH) 0.9 %
10 SYRINGE (ML) INJECTION AS NEEDED
Status: DISCONTINUED | OUTPATIENT
Start: 2022-10-12 | End: 2022-10-12 | Stop reason: HOSPADM

## 2022-10-12 RX ORDER — SODIUM CHLORIDE 0.9 % (FLUSH) 0.9 %
10 SYRINGE (ML) INJECTION EVERY 12 HOURS SCHEDULED
Status: DISCONTINUED | OUTPATIENT
Start: 2022-10-12 | End: 2022-10-12 | Stop reason: HOSPADM

## 2022-10-12 RX ORDER — LIDOCAINE HYDROCHLORIDE 20 MG/ML
INJECTION, SOLUTION EPIDURAL; INFILTRATION; INTRACAUDAL; PERINEURAL AS NEEDED
Status: DISCONTINUED | OUTPATIENT
Start: 2022-10-12 | End: 2022-10-12 | Stop reason: SURG

## 2022-10-12 RX ORDER — LIDOCAINE HYDROCHLORIDE 10 MG/ML
0.5 INJECTION, SOLUTION EPIDURAL; INFILTRATION; INTRACAUDAL; PERINEURAL ONCE AS NEEDED
Status: DISCONTINUED | OUTPATIENT
Start: 2022-10-12 | End: 2022-10-12 | Stop reason: HOSPADM

## 2022-10-12 RX ORDER — SODIUM CHLORIDE, SODIUM LACTATE, POTASSIUM CHLORIDE, CALCIUM CHLORIDE 600; 310; 30; 20 MG/100ML; MG/100ML; MG/100ML; MG/100ML
9 INJECTION, SOLUTION INTRAVENOUS CONTINUOUS PRN
Status: DISCONTINUED | OUTPATIENT
Start: 2022-10-12 | End: 2022-10-12 | Stop reason: HOSPADM

## 2022-10-12 RX ADMIN — PROPOFOL 50 MG: 10 INJECTION, EMULSION INTRAVENOUS at 10:46

## 2022-10-12 RX ADMIN — PROPOFOL 50 MG: 10 INJECTION, EMULSION INTRAVENOUS at 10:51

## 2022-10-12 RX ADMIN — PROPOFOL 50 MG: 10 INJECTION, EMULSION INTRAVENOUS at 10:55

## 2022-10-12 RX ADMIN — LIDOCAINE HYDROCHLORIDE 100 MG: 20 INJECTION, SOLUTION EPIDURAL; INFILTRATION; INTRACAUDAL; PERINEURAL at 10:46

## 2022-10-12 RX ADMIN — SODIUM CHLORIDE, POTASSIUM CHLORIDE, SODIUM LACTATE AND CALCIUM CHLORIDE 9 ML/HR: 600; 310; 30; 20 INJECTION, SOLUTION INTRAVENOUS at 10:03

## 2022-10-12 NOTE — ANESTHESIA POSTPROCEDURE EVALUATION
Patient: Roula Cook    Procedure Summary     Date: 10/12/22 Room / Location: [facility] ENDOSCOPY 2 /  [facility]    Anesthesia Start: 1044 Anesthesia Stop: 1112    Procedure: COLONOSCOPY Diagnosis:       Right upper quadrant abdominal pain      Anemia, unspecified type      (Right upper quadrant abdominal pain [R10.11])      (Anemia, unspecified type [D64.9])    Surgeons: Nitesh Benoit MD Provider: Lorenza Gold MD    Anesthesia Type: MAC ASA Status: 3          Anesthesia Type: MAC    Vitals  No vitals data found for the desired time range.          Post Anesthesia Care and Evaluation    Patient location during evaluation: PHASE II  Patient participation: complete - patient participated  Level of consciousness: awake  Pain management: adequate    Airway patency: patent  Anesthetic complications: No anesthetic complications  PONV Status: none  Cardiovascular status: acceptable  Respiratory status: acceptable  Hydration status: acceptable

## 2022-10-12 NOTE — H&P
No chief complaint on file.      HPI  Patient today for screening colonoscopy.         Problem List:    Patient Active Problem List   Diagnosis   • Anxiety   • Chronic low back pain   • COPD (chronic obstructive pulmonary disease) ([facility])   • Fibromyalgia   • Gastroesophageal reflux disease   • HTN (hypertension)   • Thyroid disease   • Irritable bowel syndrome   • Nausea   • Left upper quadrant pain   • Right upper quadrant abdominal pain   • Epigastric pain   • Anemia       Medical History:    Past Medical History:   Diagnosis Date   • Asthma    • COPD (chronic obstructive pulmonary disease) ([facility])    • COVID-19 2020    hospitalized until 20   • Diabetes mellitus ([facility])    • Diverticulitis of colon    • Fibromyalgia    • Gastritis    • GERD (gastroesophageal reflux disease)    • Hyperlipidemia    • Hypertension    • Irritable bowel syndrome         Social History:    Social History     Socioeconomic History   • Marital status: Legally    Tobacco Use   • Smoking status: Former     Types: Cigarettes     Quit date: 3/22/2010     Years since quittin.5   • Smokeless tobacco: Never   Vaping Use   • Vaping Use: Never used   Substance and Sexual Activity   • Alcohol use: Never   • Drug use: Never   • Sexual activity: Never       Family History:   Family History   Problem Relation Age of Onset   • Heart attack Mother    • Diverticulosis Mother    • Colon cancer Father    • Heart attack Father        Surgical History:   Past Surgical History:   Procedure Laterality Date   • ABDOMINAL SURGERY     • AXILLARY ABCESS IRRIGATION AND DEBRIDEMENT     • CHOLECYSTECTOMY     • COLON RESECTION     • COLONOSCOPY  2017   • ENDOSCOPY N/A 3/23/2022    Procedure: ESOPHAGOGASTRODUODENOSCOPY WITH BIOPSY;  Surgeon: Nitesh Benoit MD;  Location: [facility];  Service: Gastroenterology;  Laterality: N/A;  small bowel biopsy rule out Celiac  gastric random biopsy   • HYSTERECTOMY     • SINUS SURGERY      3   •  "THYROIDECTOMY     • TONSILLECTOMY     • UPPER GASTROINTESTINAL ENDOSCOPY  01/2020         Current Facility-Administered Medications:   •  lactated ringers infusion, 9 mL/hr, Intravenous, Continuous PRN, Romy, Yvan M, CRNA, Last Rate: 9 mL/hr at 10/12/22 1003, 9 mL/hr at 10/12/22 1003  •  lactated ringers infusion, 30 mL/hr, Intravenous, Continuous PRN, Troy, Batsheva G, APRN  •  lidocaine PF 1% (XYLOCAINE) injection 0.5 mL, 0.5 mL, Injection, Once PRN, Romy, Yvan M, CRNA  •  sodium chloride 0.9 % flush 10 mL, 10 mL, Intravenous, PRN, Romy, Yvan M, CRNA  •  sodium chloride 0.9 % flush 10 mL, 10 mL, Intravenous, Q12H, Romy, Yvan M, CRNA  •  sodium chloride 0.9 % flush 10 mL, 10 mL, Intravenous, PRN, Troy, Batsheva G, APRN  •  sodium chloride 0.9 % flush 3 mL, 3 mL, Intravenous, Q12H, Troy, Batsheva G, APRN  •  sodium chloride 0.9 % infusion 40 mL, 40 mL, Intravenous, PRN, Romy, Yvan M, CRNA    Allergies:   Allergies   Allergen Reactions   • Dilaudid  [Hydromorphone Hcl] Palpitations, GI Intolerance and Shortness Of Breath   • Statins GI Intolerance and Other (See Comments)   • Ondansetron Hcl GI Intolerance   • Adhesive Tape Hives   • Butorphanol Itching          Review of Systems   Pertinent items are noted in HPI      The following portions of the patient's history were reviewed by me and updated as appropriate: review of systems, allergies, current medications, past family history, past medical history, past social history, past surgical history and problem list.    /82 (BP Location: Left arm, Patient Position: Lying)   Pulse 69   Temp 98.1 °F (36.7 °C) (Oral)   Resp 18   Ht 167.6 cm (66\")   Wt (!) 141 kg (311 lb)   SpO2 93%   BMI 50.20 kg/m²     PHYSICAL EXAM:    CONSTITUTIONAL:  today's vital signs reviewed by me  GASTROINTESTINAL: abdomen is soft nontender nondistended with normal active bowel sounds, no masses are appreciated    Debilities: None  Emotional " Behavior: Appropriate    Assessment/ Plan  We will proceed today with colonoscopy.    Risks and benefits as well as alternatives to endoscopic evaluation were explained to the patient and they voiced understanding and wish to proceed.  These risks include but are not limited to the risk of bleeding, perforation, adverse reaction to sedation, and missed lesions.  The patient was given the opportunity to ask questions prior to the endoscopic procedure.

## 2022-10-12 NOTE — ANESTHESIA POSTPROCEDURE EVALUATION
Patient: Roula Cook    Procedure Summary     Date: 10/12/22 Room / Location: [facility] ENDOSCOPY 2 /  [facility]    Anesthesia Start: 1044 Anesthesia Stop: 1112    Procedure: COLONOSCOPY Diagnosis:       Right upper quadrant abdominal pain      Anemia, unspecified type      (Right upper quadrant abdominal pain [R10.11])      (Anemia, unspecified type [D64.9])    Surgeons: Nitesh Benoit MD Provider: Lorenza Gold MD    Anesthesia Type: MAC ASA Status: 3          Anesthesia Type: MAC    Vitals  No vitals data found for the desired time range.          Post Anesthesia Care and Evaluation    Patient location during evaluation: bedside  Patient participation: complete - patient participated  Level of consciousness: awake and alert  Pain score: 0  Pain management: adequate    Airway patency: patent  Anesthetic complications: No anesthetic complications  PONV Status: none  Cardiovascular status: acceptable  Respiratory status: acceptable  Hydration status: acceptable  No anesthesia care post op

## 2022-10-12 NOTE — ANESTHESIA PREPROCEDURE EVALUATION
Anesthesia Evaluation     Patient summary reviewed and Nursing notes reviewed   history of anesthetic complications: PONV  NPO Solid Status: > 8 hours  NPO Liquid Status: > 8 hours           Airway   Mallampati: II  TM distance: >3 FB  Neck ROM: full  Possible difficult intubation  Dental          Pulmonary     breath sounds clear to auscultation  (+) a smoker (quit 12yrs ago) Former, COPD, asthma (pt states only when shes sick. last inhaler months ago),home oxygen (2liters with activity and 3liters while sleeping ), sleep apnea (does not use cpap. sleep study done 2 months ago. Going to follow up with MD to determine need for CPAP),   Cardiovascular   Exercise tolerance: good (4-7 METS)    Rhythm: regular  Rate: normal    (+) hypertension well controlled less than 2 medications, DVT resolved, hyperlipidemia,       Neuro/Psych  (+) weakness (generalized ), psychiatric history Anxiety,    GI/Hepatic/Renal/Endo    (+) morbid obesity, GERD poorly controlled,  liver disease fatty liver disease, renal disease stones, diabetes mellitus (pt states A1C is 7) type 2, thyroid problem hypothyroidism    ROS Comment: Irritable bowel syndrome  Gastris     Musculoskeletal     (+) back pain, chronic pain,   Abdominal   (+) obese,    Substance History - negative use     OB/GYN          Other - negative ROS       ROS/Med Hx Other: Had covid in 2020 pt states muscle weakness from that was in hospital for 63days on vent and ecmo.  Pt had it again in January.      Narrative & Impression      HEART RATE= 65  bpm  RR Interval= 924  ms  MN Interval= 216  ms  P Horizontal Axis= 13  deg  P Front Axis= 21  deg  QRSD Interval= 95  ms  QT Interval= 434  ms  QRS Axis= 54  deg  T Wave Axis= 44  deg  - ABNORMAL ECG -  Sinus rhythm  Prolonged MN interval  Low voltage, precordial leads  POOR R WAVE PROGRESSION  NO PRIOR TRACING AVAILABLE FOR COMPARISON  Electronically Signed By: Dain Griffith ([facility]) 23-Mar-2022 20:35:53  Date and Time of Study:  2022-03-23 12:51:59    Specimen Collected: 03/23/22 12:51 EDT                          Anesthesia Plan    ASA 3     MAC     intravenous induction     Anesthetic plan, risks, benefits, and alternatives have been provided, discussed and informed consent has been obtained with: patient.    Use of blood products discussed with patient  Consented to blood products.   Plan discussed with CRNA.        CODE STATUS:

## 2022-10-12 NOTE — ADDENDUM NOTE
Addendum  created 10/12/22 1253 by Lorenza Gold MD    Attestation recorded in Intraprocedure, Flowsheet accepted, Intraprocedure Attestations filed

## 2022-10-14 LAB
LAB AP CASE REPORT: NORMAL
PATH REPORT.FINAL DX SPEC: NORMAL
PATH REPORT.GROSS SPEC: NORMAL

## 2022-11-01 ENCOUNTER — OFFICE VISIT (OUTPATIENT)
Dept: GASTROENTEROLOGY | Facility: CLINIC | Age: 47
End: 2022-11-01

## 2022-11-01 VITALS
WEIGHT: 293 LBS | OXYGEN SATURATION: 94 % | BODY MASS INDEX: 47.09 KG/M2 | DIASTOLIC BLOOD PRESSURE: 80 MMHG | TEMPERATURE: 97 F | SYSTOLIC BLOOD PRESSURE: 126 MMHG | HEART RATE: 68 BPM | HEIGHT: 66 IN

## 2022-11-01 DIAGNOSIS — K31.84 GASTROPARESIS: ICD-10-CM

## 2022-11-01 DIAGNOSIS — R11.0 NAUSEA: Primary | ICD-10-CM

## 2022-11-01 PROCEDURE — 99214 OFFICE O/P EST MOD 30 MIN: CPT | Performed by: NURSE PRACTITIONER

## 2022-11-01 RX ORDER — ERGOCALCIFEROL 1.25 MG/1
50000 CAPSULE ORAL
COMMUNITY
Start: 2022-08-04 | End: 2022-11-02

## 2022-11-01 RX ORDER — LIOTHYRONINE SODIUM 5 UG/1
10 TABLET ORAL DAILY
Qty: 60 TABLET | Refills: 2 | COMMUNITY
Start: 2022-08-04 | End: 2022-11-02

## 2022-11-01 RX ORDER — LEVOTHYROXINE SODIUM 200 UG/ML
SOLUTION ORAL
COMMUNITY
Start: 2022-08-06

## 2022-11-01 RX ORDER — GABAPENTIN 800 MG/1
1 TABLET ORAL 4 TIMES DAILY
COMMUNITY

## 2022-11-01 RX ORDER — HYDROCODONE BITARTRATE AND ACETAMINOPHEN 10; 325 MG/1; MG/1
TABLET ORAL
COMMUNITY
Start: 2022-10-20

## 2022-11-01 RX ORDER — POLYETHYLENE GLYCOL 3350, SODIUM CHLORIDE, SODIUM BICARBONATE, POTASSIUM CHLORIDE 420; 11.2; 5.72; 1.48 G/4L; G/4L; G/4L; G/4L
POWDER, FOR SOLUTION ORAL
COMMUNITY

## 2022-11-01 RX ORDER — LEVOTHYROXINE SODIUM 200 UG/ML
250 SOLUTION ORAL DAILY
Qty: 37.5 ML | Refills: 5 | COMMUNITY
Start: 2022-08-04 | End: 2023-01-31

## 2022-11-01 RX ORDER — GLIPIZIDE 5 MG/1
5 TABLET, FILM COATED, EXTENDED RELEASE ORAL DAILY
COMMUNITY

## 2022-11-01 NOTE — PROGRESS NOTES
"Chief Complaint   Patient presents with   • Nausea         History of Present Illness  Patient is a 47-year-old female who presents today for follow-up.    She has a history of gastroparesis, diverticulitis status post resection, fatty liver, and irritable bowel syndrome.  She had a CT scan in June that showed a liver lesion favored to be a hemangioma. She had an additional CT in July that showed this was a hemangioma.  She had a colonoscopy in October for screening purposes with poor prep, 2 hyperplastic polyps.    Patient presents to the today for follow-up with concerns about worsening nausea.  Reports this has been present on a daily basis and she has dry heaving associated with this but has not vomited.  She reports abdominal bloating and distention that comes and goes and can be significant when it occurs.  Reports discomfort to her upper abdomen associated with this.    She reports a history of hypothyroidism and diabetes and reports that there has been concerned she is not processing her medications appropriately due to gastroparesis.    She has previously tried erythromycin for gastroparesis with no improvement.  She has a cardiac history and follows regularly with cardiology.    Denies any bowel complaints at today's visit.     Result Review :       Tissue Pathology Exam (10/12/2022 10:52)   COLONOSCOPY (10/12/2022 10:42)   CT Abdomen Pelvis With Contrast (06/14/2022 18:49)   Office Visit with Batsheva Simmons APRN (05/20/2022)   Tissue Pathology Exam (03/23/2022 13:06)   UPPER GI ENDOSCOPY (03/23/2022 12:56)   Office Visit with Batsheva Simmons APRN (01/06/2022)   Office Visit with Batsheva Simmons APRN (03/12/2021)   Tissue Pathology Exam (01/30/2020 08:50)   SCANNED - COLONOSCOPY (06/13/2017)       Vital Signs:   /80   Pulse 68   Temp 97 °F (36.1 °C)   Ht 167.6 cm (66\")   Wt (!) 142 kg (312 lb 1.6 oz)   SpO2 94%   BMI 50.37 kg/m²     Body mass index is 50.37 kg/m².     Physical " Exam  Vitals reviewed.   Constitutional:       General: She is not in acute distress.     Appearance: She is well-developed.   HENT:      Head: Normocephalic and atraumatic.   Pulmonary:      Effort: Pulmonary effort is normal. No respiratory distress.   Abdominal:      General: Abdomen is protuberant. Bowel sounds are normal.      Palpations: Abdomen is soft.      Tenderness: There is no abdominal tenderness.   Skin:     General: Skin is dry.      Coloration: Skin is not pale.   Neurological:      Mental Status: She is alert and oriented to person, place, and time.   Psychiatric:         Thought Content: Thought content normal.           Assessment and Plan    Diagnoses and all orders for this visit:    1. Nausea (Primary)    2. Gastroparesis  -     Ambulatory Referral to Gastroenterology         Discussion  Patient presents today for follow-up with concerns about worsening nausea and bloating.  Symptoms are felt to be secondary to underlying gastroparesis.  Discussed consideration for trial of domperidone however patient concern for cardiac side effects reported to avoid this treatment option.  Reviewed dietary modifications to help with gastroparesis and recommended trial of gingerroot capsules.  Will refer to [facility] motility clinic for further evaluation and management to see if patient may be a candidate for infusion therapy or gastric stimulation.          Follow Up   Return if symptoms worsen or fail to improve.    Patient Instructions   For gastroparesis, recommend trial of dorothea root capsules available over-the-counter. Dorothea root 500 mg capsules, take 1 to 2 capsules 3 times daily before meals, max 6 capsules per day.     For gastroparesis, follow a gastroparesis diet. Eat smaller more frequent meals as opposed to large meals and foods lower in fat and fiber.     Refer to [facility] motility clinic for further evaluation and management of gastroparesis.    For colon cancer  screening and due to poor prep on recent colonoscopy, colonoscopy recommended to be repeated in 6 months.

## 2022-11-01 NOTE — PATIENT INSTRUCTIONS
For gastroparesis, recommend trial of dorothea root capsules available over-the-counter. Dorothea root 500 mg capsules, take 1 to 2 capsules 3 times daily before meals, max 6 capsules per day.     For gastroparesis, follow a gastroparesis diet. Eat smaller more frequent meals as opposed to large meals and foods lower in fat and fiber.     Refer to [facility] motility clinic for further evaluation and management of gastroparesis.    For colon cancer screening and due to poor prep on recent colonoscopy, colonoscopy recommended to be repeated in 6 months.

## (undated) DEVICE — SAFELINER SUCTION CANISTER 1000CC: Brand: DEROYAL

## (undated) DEVICE — KT ORCA ORCAPOD DISP STRL

## (undated) DEVICE — FRCP BX RADJAW4 NDL 2.8 240CM LG OG BX40

## (undated) DEVICE — GOWN ,SIRUS,NONREINFORCED 3XL: Brand: MEDLINE

## (undated) DEVICE — Device

## (undated) DEVICE — SNAR POLYP CAPTIFLX MICRO OVL 13MM 240CM

## (undated) DEVICE — BW-412T DISP COMBO CLEANING BRUSH: Brand: SINGLE USE COMBINATION CLEANING BRUSH

## (undated) DEVICE — THE SINGLE USE ETRAP – POLYP TRAP IS USED FOR SUCTION RETRIEVAL OF ENDOSCOPICALLY REMOVED POLYPS.: Brand: ETRAP

## (undated) DEVICE — ADAPT CLN BIOGUARD AIR/H2O DISP

## (undated) DEVICE — SYR LL 3CC

## (undated) DEVICE — VIAL FORMALIN CAP 10P 40ML

## (undated) DEVICE — SPNG GZ WOVN 4X4IN 12PLY 10/BX STRL

## (undated) DEVICE — THE BITE BLOCK MAXI, LATEX FREE STRAP IS USED TO PROTECT THE ENDOSCOPE INSERTION TUBE FROM BEING BITTEN BY THE PATIENT.